# Patient Record
Sex: FEMALE | Race: BLACK OR AFRICAN AMERICAN | Employment: FULL TIME | URBAN - METROPOLITAN AREA
[De-identification: names, ages, dates, MRNs, and addresses within clinical notes are randomized per-mention and may not be internally consistent; named-entity substitution may affect disease eponyms.]

---

## 2020-11-22 ENCOUNTER — HOSPITAL ENCOUNTER (INPATIENT)
Age: 60
LOS: 6 days | Discharge: HOME | DRG: 177 | End: 2020-11-28
Attending: FAMILY MEDICINE | Admitting: FAMILY MEDICINE
Payer: COMMERCIAL

## 2020-11-22 ENCOUNTER — APPOINTMENT (OUTPATIENT)
Dept: GENERAL RADIOLOGY | Age: 60
DRG: 177 | End: 2020-11-22
Attending: STUDENT IN AN ORGANIZED HEALTH CARE EDUCATION/TRAINING PROGRAM
Payer: COMMERCIAL

## 2020-11-22 DIAGNOSIS — J12.82 PNEUMONIA DUE TO COVID-19 VIRUS: Primary | ICD-10-CM

## 2020-11-22 DIAGNOSIS — U07.1 PNEUMONIA DUE TO COVID-19 VIRUS: Primary | ICD-10-CM

## 2020-11-22 LAB
ALBUMIN SERPL-MCNC: 3 G/DL (ref 3.5–5)
ALBUMIN/GLOB SERPL: 0.6 {RATIO} (ref 1.1–2.2)
ALP SERPL-CCNC: 74 U/L (ref 45–117)
ALT SERPL-CCNC: 62 U/L (ref 12–78)
ANION GAP SERPL CALC-SCNC: 7 MMOL/L (ref 5–15)
ARTERIAL PATENCY WRIST A: ABNORMAL
AST SERPL W P-5'-P-CCNC: 29 U/L (ref 15–37)
BASE DEFICIT BLDA-SCNC: 2.2 MMOL/L (ref 0–2)
BASOPHILS # BLD: 0 K/UL (ref 0–0.1)
BASOPHILS NFR BLD: 0 % (ref 0–1)
BDY SITE: ABNORMAL
BILIRUB SERPL-MCNC: 0.9 MG/DL (ref 0.2–1)
BNP SERPL-MCNC: 15 PG/ML
BUN SERPL-MCNC: 11 MG/DL (ref 6–20)
BUN/CREAT SERPL: 15 (ref 12–20)
CA-I BLD-MCNC: 9.3 MG/DL (ref 8.5–10.1)
CHLORIDE SERPL-SCNC: 106 MMOL/L (ref 97–108)
CO2 SERPL-SCNC: 27 MMOL/L (ref 21–32)
CREAT SERPL-MCNC: 0.71 MG/DL (ref 0.55–1.02)
CRP SERPL-MCNC: 10.5 MG/DL (ref 0–0.6)
DIFFERENTIAL METHOD BLD: ABNORMAL
EOSINOPHIL # BLD: 0 K/UL (ref 0–0.4)
EOSINOPHIL NFR BLD: 0 % (ref 0–7)
ERYTHROCYTE [DISTWIDTH] IN BLOOD BY AUTOMATED COUNT: 14.5 % (ref 11.5–14.5)
FIO2 ON VENT: 21 %
GLOBULIN SER CALC-MCNC: 5.1 G/DL (ref 2–4)
GLUCOSE BLD STRIP.AUTO-MCNC: 158 MG/DL (ref 65–100)
GLUCOSE BLD STRIP.AUTO-MCNC: 159 MG/DL (ref 65–100)
GLUCOSE BLD STRIP.AUTO-MCNC: 94 MG/DL (ref 65–100)
GLUCOSE SERPL-MCNC: 157 MG/DL (ref 65–100)
HCO3 BLDA-SCNC: 22 MMOL/L (ref 22–26)
HCT VFR BLD AUTO: 39.8 % (ref 35–47)
HGB BLD-MCNC: 12.7 G/DL (ref 11.5–16)
IMM GRANULOCYTES # BLD AUTO: 0 K/UL (ref 0–0.04)
IMM GRANULOCYTES NFR BLD AUTO: 0 % (ref 0–0.5)
LACTATE SERPL-SCNC: 1.1 MMOL/L (ref 0.4–2)
LDH SERPL L TO P-CCNC: 402 U/L (ref 81–246)
LYMPHOCYTES # BLD: 0.7 K/UL (ref 0.8–3.5)
LYMPHOCYTES NFR BLD: 9 % (ref 12–49)
MCH RBC QN AUTO: 27.8 PG (ref 26–34)
MCHC RBC AUTO-ENTMCNC: 31.9 G/DL (ref 30–36.5)
MCV RBC AUTO: 87.1 FL (ref 80–99)
MONOCYTES # BLD: 0.7 K/UL (ref 0–1)
MONOCYTES NFR BLD: 8 % (ref 5–13)
NEUTS SEG # BLD: 6.4 K/UL (ref 1.8–8)
NEUTS SEG NFR BLD: 83 % (ref 32–75)
PCO2 BLDA: 35 MMHG (ref 35–45)
PERFORMED BY, TECHID: ABNORMAL
PERFORMED BY, TECHID: ABNORMAL
PERFORMED BY, TECHID: NORMAL
PH BLDA: 7.4 [PH] (ref 7.35–7.45)
PLATELET # BLD AUTO: 368 K/UL (ref 150–400)
PMV BLD AUTO: 9.9 FL (ref 8.9–12.9)
PO2 BLDA: 61 MMHG (ref 75–100)
POTASSIUM SERPL-SCNC: 3.2 MMOL/L (ref 3.5–5.1)
PROCALCITONIN SERPL-MCNC: <0.05 NG/ML
PROT SERPL-MCNC: 8.1 G/DL (ref 6.4–8.2)
RBC # BLD AUTO: 4.57 M/UL (ref 3.8–5.2)
SAO2 % BLD: 92 %
SAO2% DEVICE SAO2% SENSOR NAME: ABNORMAL
SODIUM SERPL-SCNC: 140 MMOL/L (ref 136–145)
TROPONIN I SERPL-MCNC: <0.05 NG/ML
WBC # BLD AUTO: 7.8 K/UL (ref 3.6–11)

## 2020-11-22 PROCEDURE — 83880 ASSAY OF NATRIURETIC PEPTIDE: CPT

## 2020-11-22 PROCEDURE — 83615 LACTATE (LD) (LDH) ENZYME: CPT

## 2020-11-22 PROCEDURE — 74011000250 HC RX REV CODE- 250: Performed by: INTERNAL MEDICINE

## 2020-11-22 PROCEDURE — XW033E5 INTRODUCTION OF REMDESIVIR ANTI-INFECTIVE INTO PERIPHERAL VEIN, PERCUTANEOUS APPROACH, NEW TECHNOLOGY GROUP 5: ICD-10-PCS | Performed by: INTERNAL MEDICINE

## 2020-11-22 PROCEDURE — 65270000029 HC RM PRIVATE

## 2020-11-22 PROCEDURE — 74011000258 HC RX REV CODE- 258: Performed by: FAMILY MEDICINE

## 2020-11-22 PROCEDURE — 85025 COMPLETE CBC W/AUTO DIFF WBC: CPT

## 2020-11-22 PROCEDURE — 93005 ELECTROCARDIOGRAM TRACING: CPT

## 2020-11-22 PROCEDURE — 82803 BLOOD GASES ANY COMBINATION: CPT

## 2020-11-22 PROCEDURE — 74011636637 HC RX REV CODE- 636/637: Performed by: FAMILY MEDICINE

## 2020-11-22 PROCEDURE — 71045 X-RAY EXAM CHEST 1 VIEW: CPT

## 2020-11-22 PROCEDURE — 96374 THER/PROPH/DIAG INJ IV PUSH: CPT

## 2020-11-22 PROCEDURE — 82962 GLUCOSE BLOOD TEST: CPT

## 2020-11-22 PROCEDURE — 74011250637 HC RX REV CODE- 250/637: Performed by: FAMILY MEDICINE

## 2020-11-22 PROCEDURE — 36415 COLL VENOUS BLD VENIPUNCTURE: CPT

## 2020-11-22 PROCEDURE — 74011000258 HC RX REV CODE- 258: Performed by: INTERNAL MEDICINE

## 2020-11-22 PROCEDURE — 80053 COMPREHEN METABOLIC PANEL: CPT

## 2020-11-22 PROCEDURE — 84484 ASSAY OF TROPONIN QUANT: CPT

## 2020-11-22 PROCEDURE — 74011250636 HC RX REV CODE- 250/636: Performed by: FAMILY MEDICINE

## 2020-11-22 PROCEDURE — 99221 1ST HOSP IP/OBS SF/LOW 40: CPT | Performed by: INTERNAL MEDICINE

## 2020-11-22 PROCEDURE — 99284 EMERGENCY DEPT VISIT MOD MDM: CPT

## 2020-11-22 PROCEDURE — 96375 TX/PRO/DX INJ NEW DRUG ADDON: CPT

## 2020-11-22 PROCEDURE — 74011000258 HC RX REV CODE- 258: Performed by: NURSE PRACTITIONER

## 2020-11-22 PROCEDURE — 74011250636 HC RX REV CODE- 250/636: Performed by: NURSE PRACTITIONER

## 2020-11-22 PROCEDURE — 83036 HEMOGLOBIN GLYCOSYLATED A1C: CPT

## 2020-11-22 PROCEDURE — 87040 BLOOD CULTURE FOR BACTERIA: CPT

## 2020-11-22 PROCEDURE — 84145 PROCALCITONIN (PCT): CPT

## 2020-11-22 PROCEDURE — 82728 ASSAY OF FERRITIN: CPT

## 2020-11-22 PROCEDURE — 83605 ASSAY OF LACTIC ACID: CPT

## 2020-11-22 PROCEDURE — 86140 C-REACTIVE PROTEIN: CPT

## 2020-11-22 RX ORDER — ENOXAPARIN SODIUM 100 MG/ML
40 INJECTION SUBCUTANEOUS DAILY
Status: DISCONTINUED | OUTPATIENT
Start: 2020-11-23 | End: 2020-11-23

## 2020-11-22 RX ORDER — SODIUM CHLORIDE 9 MG/ML
25 INJECTION, SOLUTION INTRAVENOUS CONTINUOUS
Status: DISCONTINUED | OUTPATIENT
Start: 2020-11-22 | End: 2020-11-28 | Stop reason: HOSPADM

## 2020-11-22 RX ORDER — MAGNESIUM SULFATE 100 %
4 CRYSTALS MISCELLANEOUS AS NEEDED
Status: DISCONTINUED | OUTPATIENT
Start: 2020-11-22 | End: 2020-11-28 | Stop reason: HOSPADM

## 2020-11-22 RX ORDER — POLYETHYLENE GLYCOL 3350 17 G/17G
17 POWDER, FOR SOLUTION ORAL DAILY PRN
Status: DISCONTINUED | OUTPATIENT
Start: 2020-11-22 | End: 2020-11-28 | Stop reason: HOSPADM

## 2020-11-22 RX ORDER — ONDANSETRON 2 MG/ML
4 INJECTION INTRAMUSCULAR; INTRAVENOUS
Status: DISCONTINUED | OUTPATIENT
Start: 2020-11-22 | End: 2020-11-28 | Stop reason: HOSPADM

## 2020-11-22 RX ORDER — AZITHROMYCIN 500 MG/1
500 TABLET, FILM COATED ORAL DAILY
Status: DISCONTINUED | OUTPATIENT
Start: 2020-11-23 | End: 2020-11-27

## 2020-11-22 RX ORDER — ACETAMINOPHEN 650 MG/1
650 SUPPOSITORY RECTAL
Status: DISCONTINUED | OUTPATIENT
Start: 2020-11-22 | End: 2020-11-24

## 2020-11-22 RX ORDER — DEXAMETHASONE SODIUM PHOSPHATE 4 MG/ML
4 INJECTION, SOLUTION INTRA-ARTICULAR; INTRALESIONAL; INTRAMUSCULAR; INTRAVENOUS; SOFT TISSUE EVERY 6 HOURS
Status: DISCONTINUED | OUTPATIENT
Start: 2020-11-22 | End: 2020-11-27

## 2020-11-22 RX ORDER — METHYLPREDNISOLONE 4 MG/1
TABLET ORAL
Qty: 1 DOSE PACK | Refills: 0 | Status: SHIPPED | OUTPATIENT
Start: 2020-11-22 | End: 2020-11-22 | Stop reason: CLARIF

## 2020-11-22 RX ORDER — POTASSIUM CHLORIDE 750 MG/1
40 TABLET, FILM COATED, EXTENDED RELEASE ORAL
Status: COMPLETED | OUTPATIENT
Start: 2020-11-22 | End: 2020-11-22

## 2020-11-22 RX ORDER — INSULIN LISPRO 100 [IU]/ML
INJECTION, SOLUTION INTRAVENOUS; SUBCUTANEOUS
Status: DISCONTINUED | OUTPATIENT
Start: 2020-11-22 | End: 2020-11-28 | Stop reason: HOSPADM

## 2020-11-22 RX ORDER — DEXTROSE 50 % IN WATER (D50W) INTRAVENOUS SYRINGE
25-50 AS NEEDED
Status: DISCONTINUED | OUTPATIENT
Start: 2020-11-22 | End: 2020-11-28 | Stop reason: HOSPADM

## 2020-11-22 RX ORDER — ACETAMINOPHEN 325 MG/1
650 TABLET ORAL
Status: DISCONTINUED | OUTPATIENT
Start: 2020-11-22 | End: 2020-11-24

## 2020-11-22 RX ORDER — ORPHENADRINE CITRATE 100 MG/1
100 TABLET, EXTENDED RELEASE ORAL 2 TIMES DAILY
Qty: 20 TAB | Refills: 0 | Status: SHIPPED | OUTPATIENT
Start: 2020-11-22 | End: 2020-11-22

## 2020-11-22 RX ORDER — ONDANSETRON 4 MG/1
4 TABLET, ORALLY DISINTEGRATING ORAL
Status: DISCONTINUED | OUTPATIENT
Start: 2020-11-22 | End: 2020-11-28 | Stop reason: HOSPADM

## 2020-11-22 RX ADMIN — INSULIN LISPRO 3 UNITS: 100 INJECTION, SOLUTION INTRAVENOUS; SUBCUTANEOUS at 17:42

## 2020-11-22 RX ADMIN — POTASSIUM CHLORIDE 40 MEQ: 750 TABLET, FILM COATED, EXTENDED RELEASE ORAL at 14:39

## 2020-11-22 RX ADMIN — DEXAMETHASONE SODIUM PHOSPHATE 4 MG: 4 INJECTION, SOLUTION INTRAMUSCULAR; INTRAVENOUS at 13:19

## 2020-11-22 RX ADMIN — METHYLPREDNISOLONE SODIUM SUCCINATE 125 MG: 125 INJECTION, POWDER, FOR SOLUTION INTRAMUSCULAR; INTRAVENOUS at 10:58

## 2020-11-22 RX ADMIN — DEXAMETHASONE SODIUM PHOSPHATE 4 MG: 4 INJECTION, SOLUTION INTRAMUSCULAR; INTRAVENOUS at 17:42

## 2020-11-22 RX ADMIN — PIPERACILLIN AND TAZOBACTAM 3.38 G: 3; .375 INJECTION, POWDER, LYOPHILIZED, FOR SOLUTION INTRAVENOUS at 10:58

## 2020-11-22 RX ADMIN — SODIUM CHLORIDE 1000 ML: 9 INJECTION, SOLUTION INTRAVENOUS at 09:48

## 2020-11-22 RX ADMIN — SODIUM CHLORIDE 200 MG: 9 INJECTION, SOLUTION INTRAVENOUS at 14:55

## 2020-11-22 RX ADMIN — PIPERACILLIN AND TAZOBACTAM 3.38 G: 3; .375 INJECTION, POWDER, LYOPHILIZED, FOR SOLUTION INTRAVENOUS at 20:49

## 2020-11-22 RX ADMIN — SODIUM CHLORIDE 75 ML/HR: 9 INJECTION, SOLUTION INTRAVENOUS at 14:39

## 2020-11-22 NOTE — CONSULTS
Consult  Pulmonary, Critical Care    Name: Rhina Mcbride MRN: [id number]   : 1960 Hospital: [address] [place] [address]   Date: 2020  Admission date: 2020 Hospital Day: 1       Subjective/Interval History:   Patient seen on medical floor. She is currently wearing oxygen. States she tested positive for COVID-19 a little under a week ago. Had been feeling fine but yesterday developed nonproductive cough and shortness of breath particularly with exertion. Felt slightly feverish but has not had any alteration in her sense of smell or taste no appetite suppression. Hospital Problems  Never Reviewed          Codes Class Noted POA    COVID-19 ICD-10-CM: U07.1  ICD-9-CM: 079.89  2020 Unknown              IMPRESSION:   1. COVID-19 infection  2. Probable COVID-19 pneumonitis  3. Acute hypoxic respiratory failure  4. Hypokalemia      RECOMMENDATIONS/PLAN:   1. Agree Decadron and remdesivir  2. We will check CT of the chest if severe groundglass changes will add Actemra  3. Follow potassium  4. We will decrease IV fluids to avoid worsening pulmonary congestion          [x] High complexity decision making was performed  [x] See my orders for details      Subjective/Initial History:     I was asked by Bryan Edgar MD to see Rhina Mcbride  a 61 y.o.    female in consultation for a chief complaint of COVID-19 infection with hypoxia and abnormal chest x-ray    Allergies   Allergen Reactions    Ampicillin Itching    Percocet [Oxycodone-Acetaminophen] Itching        MAR reviewed and pertinent medications noted or modified as needed     Current Facility-Administered Medications   Medication    0.9% sodium chloride infusion    acetaminophen (TYLENOL) tablet 650 mg    Or    acetaminophen (TYLENOL) suppository 650 mg    polyethylene glycol (MIRALAX) packet 17 g    ondansetron (ZOFRAN ODT) tablet 4 mg    Or    ondansetron (ZOFRAN) injection 4 mg    [START ON 2020] enoxaparin (LOVENOX) injection 40 mg    insulin lispro (HUMALOG) injection    glucose chewable tablet 16 g    dextrose (D50W) injection syrg 12.5-25 g    glucagon (GLUCAGEN) injection 1 mg    dexamethasone (DECADRON) 4 mg/mL injection 4 mg    piperacillin-tazobactam (ZOSYN) 3.375 g in 0.9% sodium chloride (MBP/ADV) 100 mL MBP    [START ON 11/23/2020] azithromycin (ZITHROMAX) tablet 500 mg    [START ON 11/23/2020] remdesivir 100 mg in 0.9% sodium chloride 250 mL IVPB    influenza vaccine 2020-21 (4 yrs+)(PF) (FLUCELVAX QUAD) injection 0.5 mL      Patient PCP: Danny Mistry PA-C  PMH:  has a past medical history of Diabetes ([address] GERD (gastroesophageal reflux disease), and Hypertension. PSH:   has a past surgical history that includes hx other surgical.   FHX: family history is not on file. SHX:  reports that she has never smoked. She has never used smokeless tobacco. She reports that she does not drink alcohol or use drugs. Systemic review:  General states her weight is stable has not had any change in her appetite.   May have had a little bit of fever at home  Eyes no double vision momentary blindness  ENT no facial pain over the sinuses or drainage  Musculoskeletal no swollen tender joints no myalgias  Endocrinologic no polyuria polydipsia  Neurologic no seizures or syncope  Gastrointestinal soft has a history of reflux states as long as she takes her medications she is asymptomatic no sour bitter taste at night no nausea vomiting as mentioned her sense of taste is normal  Genitourinary no pain or discomfort on urination no bleeding or drainage  Cardiovascular no ankle edema chest pain or diaphoresis  Respiratory new onset cough yesterday nonproductive no definite fever although she may have felt hot    Objective:     Vital Signs: Telemetry:    normal sinus rhythm Intake/Output:   Visit Vitals  /81 (BP 1 Location: Left arm, BP Patient Position: At rest)   Pulse (!) 107   Temp 98.4 °F (36.9 °C)   Resp 18   Ht 5' 6\" (1.676 m)   Wt 84.8 kg (186 lb 15.2 oz)   SpO2 91%   Breastfeeding No   BMI 30.17 kg/m²       Temp (24hrs), Av.1 °F (37.3 °C), Min:98.4 °F (36.9 °C), Max:99.7 °F (37.6 °C)        O2 Device: Nasal cannula O2 Flow Rate (L/min): 2 l/min       Wt Readings from Last 4 Encounters:   20 84.8 kg (186 lb 15.2 oz)        No intake or output data in the 24 hours ending 20 1721    Last shift:      No intake/output data recorded. Last 3 shifts: No intake/output data recorded. Physical Exam:   General:  female; in no apparent distress; on nasal oxygen 2 L  HEENT: NCAT, normal oral mucosa  Eyes: anicteric; conjunctiva clear extraocular movements intact  Neck: no nodes, no JVD no accessory muscle usage  Chest: no deformity,   Cardiac: Regular rate and rhythm no definite murmur no edema  Lungs: Clear anteriorly and laterally right posterior has rales extending FCI up left side has minimal rales in the left base  Abd: Soft nontender normal bowel sounds no organomegaly  Ext: no edema; no joint swelling; No clubbing  :clear urine  Neuro: Awake alert oriented speech is clear moves all 4 extremities normally  Psych- no agitation, oriented to person;  Skin: warm, dry, no cyanosis;   Pulses: Brachial radial femoral pulses intact  Capillary: Normal capillary refill    Labs:    Recent Labs     20  0845   WBC 7.8   HGB 12.7        Recent Labs     20  0845      K 3.2*      CO2 27   *   BUN 11   CREA 0.71   CA 9.3   LAC 1.1   ALB 3.0*   ALT 62     Recent Labs     20  1525   PH 7.402   PCO2 35   PO2 61*   HCO3 22   FIO2 21.0   Currently on 2 L nasal oxygen oxygen saturation 96%  Recent Labs     20  0845   TROIQ <0.05     CRP 10.5  Procalcitonin less than 0.05    BNP 15  Imaging:    CXR Results  (Last 48 hours)               20 0852  XR CHEST SNGL V Final result    Impression:  Impression:    1.  Mild pulmonary vascular congestion. 2. Mild bibasilar airspace disease, atelectasis versus developing pneumonia. Narrative:  AP portable chest, 0847 hours. Comparison: 11/12/2015. Findings: The cardiomediastinal silhouette is within normal limits. There is   mild pulmonary vascular congestion. Mild bibasilar airspace disease is noted. There is no pleural effusion or pneumothorax. There are senescent changes within   the spine. Results from [place] encounter on 11/22/20   XR CHEST SNGL V    Narrative AP portable chest, 0847 hours. Comparison: 11/12/2015. Findings: The cardiomediastinal silhouette is within normal limits. There is  mild pulmonary vascular congestion. Mild bibasilar airspace disease is noted. There is no pleural effusion or pneumothorax. There are senescent changes within  the spine. Impression Impression:   1. Mild pulmonary vascular congestion. 2. Mild bibasilar airspace disease, atelectasis versus developing pneumonia. · Discussion COVID-19 test +5 to 7 days ago with now with onset cough and shortness of breath. Chest x-ray has vague changes of bilateral patchy infiltrate. Will check CT scan to confirm groundglass changes. Will follow oxygen. Thank you for allowing me to see Ms. Jos Moncada MD

## 2020-11-22 NOTE — ROUTINE PROCESS
TRANSFER - OUT REPORT: 
 
Verbal report given to gregory(name) on Wilner Hammer  being transferred to [facility](unit) for routine progression of care Report consisted of patients Situation, Background, Assessment and  
Recommendations(SBAR). Information from the following report(s) SBAR, ED Summary, MAR, Recent Results and Med Rec Status was reviewed with the receiving nurse. Lines:  
Peripheral IV 11/22/20 Distal;Posterior;Right Forearm (Active) Site Assessment Clean, dry, & intact 11/22/20 0944 Dressing Status Clean, dry, & intact 11/22/20 0944 Dressing Type Transparent 11/22/20 8778 Opportunity for questions and clarification was provided. Patient transported with: 
 Registered Nurse

## 2020-11-22 NOTE — H&P
History and Physical    NAME: Beatrice Arnold   :  1960   MRN:  [id number]     Date/Time:  2020 12:26 PM    Patient PCP: Tim Quevedo PA-C  ______________________________________________________________________             Subjective:     CHIEF COMPLAINT:     Dizziness shortness of breath    HISTORY OF PRESENT ILLNESS:       Patient is a 61y.o. year old female past medical history of diabetes hypertension came to the ER complaining of dizziness and some shortness of breath patient was seen at [facility] for dizziness cough not feeling well screening for Covid came back positive her system get more worse still coughing shortness of breath on little exertion dizziness seen by the ER physician and recommended patient to be admitted for further evaluation and treatment    No past medical history on file. Medical history of diabetes and hypertension    No past surgical history on file. Social History     Tobacco Use    Smoking status: Not on file   Substance Use Topics    Alcohol use: Not on file   Patient denies of smoking cigarettes alcohol or drugs    No family history on file. No Known Allergies   No known drug allergies  Prior to Admission medications    Not on File         Current Facility-Administered Medications:     piperacillin-tazobactam (ZOSYN) 3.375 g in 0.9% sodium chloride (MBP/ADV) 100 mL MBP, 3.375 g, IntraVENous, NOW, Rhiannon Burks NP, Last Rate: 25 mL/hr at 20 1058, 3.375 g at 20 1058    dexamethasone (DECADRON) 4 mg/mL injection 4 mg, 4 mg, IntraVENous, Q6H, Orlin Kelly MD    piperacillin-tazobactam (ZOSYN) 3.375 g in 0.9% sodium chloride (MBP/ADV) 100 mL MBP, 3.375 g, IntraVENous, Q8H, Orlin Kelly MD    [START ON 2020] azithromycin (ZITHROMAX) tablet 500 mg, 500 mg, Oral, DAILY, Darren Kelly MD  No current outpatient medications on file.     LAB DATA REVIEWED:    Recent Results (from the past 24 hour(s))   CBC WITH AUTOMATED DIFF    Collection Time: 11/22/20  8:45 AM   Result Value Ref Range    WBC 7.8 3.6 - 11.0 K/uL    RBC 4.57 3.80 - 5.20 M/uL    HGB 12.7 11.5 - 16.0 g/dL    HCT 39.8 35.0 - 47.0 %    MCV 87.1 80.0 - 99.0 FL    MCH 27.8 26.0 - 34.0 PG    MCHC 31.9 30.0 - 36.5 g/dL    RDW 14.5 11.5 - 14.5 %    PLATELET 405 787 - 967 K/uL    MPV 9.9 8.9 - 12.9 FL    NEUTROPHILS 83 (H) 32 - 75 %    LYMPHOCYTES 9 (L) 12 - 49 %    MONOCYTES 8 5 - 13 %    EOSINOPHILS 0 0 - 7 %    BASOPHILS 0 0 - 1 %    IMMATURE GRANULOCYTES 0 0.0 - 0.5 %    ABS. NEUTROPHILS 6.4 1.8 - 8.0 K/UL    ABS. LYMPHOCYTES 0.7 (L) 0.8 - 3.5 K/UL    ABS. MONOCYTES 0.7 0.0 - 1.0 K/UL    ABS. EOSINOPHILS 0.0 0.0 - 0.4 K/UL    ABS. BASOPHILS 0.0 0.0 - 0.1 K/UL    ABS. IMM. GRANS. 0.0 0.00 - 0.04 K/UL    DF AUTOMATED     METABOLIC PANEL, COMPREHENSIVE    Collection Time: 11/22/20  8:45 AM   Result Value Ref Range    Sodium 140 136 - 145 mmol/L    Potassium 3.2 (L) 3.5 - 5.1 mmol/L    Chloride 106 97 - 108 mmol/L    CO2 27 21 - 32 mmol/L    Anion gap 7 5 - 15 mmol/L    Glucose 157 (H) 65 - 100 mg/dL    BUN 11 6 - 20 mg/dL    Creatinine 0.71 0.55 - 1.02 mg/dL    BUN/Creatinine ratio 15 12 - 20      GFR est AA >60 >60 ml/min/1.73m2    GFR est non-AA >60 >60 ml/min/1.73m2    Calcium 9.3 8.5 - 10.1 mg/dL    Bilirubin, total 0.9 0.2 - 1.0 mg/dL    AST (SGOT) 29 15 - 37 U/L    ALT (SGPT) 62 12 - 78 U/L    Alk.  phosphatase 74 45 - 117 U/L    Protein, total 8.1 6.4 - 8.2 g/dL    Albumin 3.0 (L) 3.5 - 5.0 g/dL    Globulin 5.1 (H) 2.0 - 4.0 g/dL    A-G Ratio 0.6 (L) 1.1 - 2.2     TROPONIN I    Collection Time: 11/22/20  8:45 AM   Result Value Ref Range    Troponin-I, Qt. <0.05 <0.05 ng/mL   BNP    Collection Time: 11/22/20  8:45 AM   Result Value Ref Range    NT pro-BNP 15 <125 pg/mL   LACTIC ACID    Collection Time: 11/22/20  8:45 AM   Result Value Ref Range    Lactic acid 1.1 0.4 - 2.0 mmol/L       XR Results (most recent):  Results from Hospital Encounter encounter on 11/22/20   XR CHEST SNGL V    Narrative AP portable chest, 0847 hours. Comparison: 11/12/2015. Findings: The cardiomediastinal silhouette is within normal limits. There is  mild pulmonary vascular congestion. Mild bibasilar airspace disease is noted. There is no pleural effusion or pneumothorax. There are senescent changes within  the spine. Impression Impression:   1. Mild pulmonary vascular congestion. 2. Mild bibasilar airspace disease, atelectasis versus developing pneumonia. XR CHEST SNGL V   Final Result   Impression:    1. Mild pulmonary vascular congestion. 2. Mild bibasilar airspace disease, atelectasis versus developing pneumonia. Review of Systems:  Constitutional: Negative for chills and fever. HENT: Negative. Eyes: Negative. Respiratory: Negative. Cardiovascular: Negative. Gastrointestinal: Negative for abdominal pain and nausea. Skin: Negative. Neurological: Negative. Objective:   VITALS:    Visit Vitals  /70 (BP 1 Location: Right arm, BP Patient Position: At rest)   Pulse (!) 107   Temp 99.7 °F (37.6 °C)   Resp 22   Ht 5' 6\" (1.676 m)   Wt 86.2 kg (190 lb)   SpO2 91%   BMI 30.67 kg/m²       Physical Exam:   Constitutional: pt is oriented to person, place, and time. HENT:   Head: Normocephalic and atraumatic. Eyes: Pupils are equal, round, and reactive to light. EOM are normal.   Cardiovascular: Normal rate, regular rhythm and normal heart sounds. Pulmonary/Chest: Breath sounds normal. No wheezes. No rales. Exhibits no tenderness. Abdominal: Soft. Bowel sounds are normal. There is no abdominal tenderness. There is no rebound and no guarding. Musculoskeletal: Normal range of motion. Neurological: pt is alert and oriented to person, place, and time. Alert. Normal strength. No cranial nerve deficit or sensory deficit. Displays a negative Romberg sign.         ASSESSMENT & PLAN:    COVID-19 positive  Acute hypoxemia  Type 2 diabetes  Hypertension  Hypokalemia    Put on Covid floor  On isolation  IV fluids  Start on Decadron 4 mg every 6 IV  IV Zosyn and Zithromax  ID consult and pulmonary consult      ________________________________________________________________________    Signed: Teri Logan MD

## 2020-11-22 NOTE — ED TRIAGE NOTES
[facility] 15 pt stated that she was feeling lightheaded, dizzy and SOB that started last Monday; denies CP c/o cough

## 2020-11-22 NOTE — CONSULTS
Consult Date: 11/22/2020    Consults: Covid-19    Subjective      This is a 61year old female with dizziness, SOB and cough. She apparently tested positive for Covid-19 at [facility] about 5 days PTA. She had low grade temperature and tachycardic. WBC normal with lymphopenia. CXR showed mild pulmonary vascular congestion and bibasilar airspace disease, atelectasis versus developing pneumonia (see image below, reviewed by me). Blood cultures sent and patient has been started on IV Azithromycin, Zosyn and Decadron. ID has been consulted for this reason. Patient seen in the ED. She states that she first sought medical attention for lightheadedness at work where she is an  at [employer]. She tested positive for Covid-19 but no treatment prescribed. She became concerned because of SOB. No change in cough. No fever or chills. No orthopnea or PND. No past medical history on file. No past surgical history on file. No family history on file. Social History     Tobacco Use    Smoking status: Not on file   Substance Use Topics    Alcohol use: Not on file       Current Facility-Administered Medications   Medication Dose Route Frequency Provider Last Rate Last Dose    piperacillin-tazobactam (ZOSYN) 3.375 g in 0.9% sodium chloride (MBP/ADV) 100 mL MBP  3.375 g IntraVENous NOW Dimas Santiago NP 25 mL/hr at 11/22/20 1058 3.375 g at 11/22/20 1058    dexamethasone (DECADRON) 4 mg/mL injection 4 mg  4 mg IntraVENous Q6H Jess Kelly MD        piperacillin-tazobactam (ZOSYN) 3.375 g in 0.9% sodium chloride (MBP/ADV) 100 mL MBP  3.375 g IntraVENous Q8H Orlin Kelly MD Annamaria Skylar [START ON 11/23/2020] azithromycin (ZITHROMAX) tablet 500 mg  500 mg Oral DAILY Orlin Kelly MD        potassium chloride SR (KLOR-CON 10) tablet 40 mEq  40 mEq Oral NOW Orlin Kelly MD            Review of Systems   Constitutional: Negative for activity change, appetite change, chills, fatigue and fever. HENT: Negative. Eyes: Negative. Respiratory: Positive for cough and shortness of breath. Negative for chest tightness and wheezing. Cardiovascular: Negative. Gastrointestinal: Negative. Endocrine: Negative. Genitourinary: Negative. Musculoskeletal: Negative. Skin: Negative. Allergic/Immunologic: Negative. Neurological: Positive for dizziness and light-headedness. Hematological: Negative. Psychiatric/Behavioral: Negative. Objective     Vital signs for last 24 hours:  Visit Vitals  /70 (BP 1 Location: Right arm, BP Patient Position: At rest)   Pulse (!) 107   Temp 99.7 °F (37.6 °C)   Resp 22   Ht 5' 6\" (1.676 m)   Wt 190 lb (86.2 kg)   SpO2 91%   BMI 30.67 kg/m²       Intake/Output this shift:  Current Shift: No intake/output data recorded. Last 3 Shifts: No intake/output data recorded. Data Review:   Recent Results (from the past 24 hour(s))   CBC WITH AUTOMATED DIFF    Collection Time: 11/22/20  8:45 AM   Result Value Ref Range    WBC 7.8 3.6 - 11.0 K/uL    RBC 4.57 3.80 - 5.20 M/uL    HGB 12.7 11.5 - 16.0 g/dL    HCT 39.8 35.0 - 47.0 %    MCV 87.1 80.0 - 99.0 FL    MCH 27.8 26.0 - 34.0 PG    MCHC 31.9 30.0 - 36.5 g/dL    RDW 14.5 11.5 - 14.5 %    PLATELET 156 249 - 530 K/uL    MPV 9.9 8.9 - 12.9 FL    NEUTROPHILS 83 (H) 32 - 75 %    LYMPHOCYTES 9 (L) 12 - 49 %    MONOCYTES 8 5 - 13 %    EOSINOPHILS 0 0 - 7 %    BASOPHILS 0 0 - 1 %    IMMATURE GRANULOCYTES 0 0.0 - 0.5 %    ABS. NEUTROPHILS 6.4 1.8 - 8.0 K/UL    ABS. LYMPHOCYTES 0.7 (L) 0.8 - 3.5 K/UL    ABS. MONOCYTES 0.7 0.0 - 1.0 K/UL    ABS. EOSINOPHILS 0.0 0.0 - 0.4 K/UL    ABS. BASOPHILS 0.0 0.0 - 0.1 K/UL    ABS. IMM.  GRANS. 0.0 0.00 - 0.04 K/UL    DF AUTOMATED     METABOLIC PANEL, COMPREHENSIVE    Collection Time: 11/22/20  8:45 AM   Result Value Ref Range    Sodium 140 136 - 145 mmol/L    Potassium 3.2 (L) 3.5 - 5.1 mmol/L    Chloride 106 97 - 108 mmol/L    CO2 27 21 - 32 mmol/L    Anion gap 7 5 - 15 mmol/L    Glucose 157 (H) 65 - 100 mg/dL    BUN 11 6 - 20 mg/dL    Creatinine 0.71 0.55 - 1.02 mg/dL    BUN/Creatinine ratio 15 12 - 20      GFR est AA >60 >60 ml/min/1.73m2    GFR est non-AA >60 >60 ml/min/1.73m2    Calcium 9.3 8.5 - 10.1 mg/dL    Bilirubin, total 0.9 0.2 - 1.0 mg/dL    AST (SGOT) 29 15 - 37 U/L    ALT (SGPT) 62 12 - 78 U/L    Alk. phosphatase 74 45 - 117 U/L    Protein, total 8.1 6.4 - 8.2 g/dL    Albumin 3.0 (L) 3.5 - 5.0 g/dL    Globulin 5.1 (H) 2.0 - 4.0 g/dL    A-G Ratio 0.6 (L) 1.1 - 2.2     TROPONIN I    Collection Time: 11/22/20  8:45 AM   Result Value Ref Range    Troponin-I, Qt. <0.05 <0.05 ng/mL   BNP    Collection Time: 11/22/20  8:45 AM   Result Value Ref Range    NT pro-BNP 15 <125 pg/mL   LACTIC ACID    Collection Time: 11/22/20  8:45 AM   Result Value Ref Range    Lactic acid 1.1 0.4 - 2.0 mmol/L     CXR (11/22)          Physical Exam  Vitals signs and nursing note reviewed. Constitutional:       General: She is not in acute distress. Appearance: Normal appearance. She is ill-appearing. She is not diaphoretic. HENT:      Head: Normocephalic and atraumatic. Nose: Nose normal.      Mouth/Throat:      Pharynx: Oropharynx is clear. Eyes:      Pupils: Pupils are equal, round, and reactive to light. Neck:      Musculoskeletal: Neck supple. Cardiovascular:      Rate and Rhythm: Normal rate and regular rhythm. Heart sounds: No murmur. Pulmonary:      Breath sounds: Rales present. No rhonchi. Abdominal:      Palpations: Abdomen is soft. Tenderness: There is no abdominal tenderness. There is no guarding. Genitourinary:     Comments: No [place]  Skin:     Findings: No rash. Neurological:      General: No focal deficit present. Mental Status: She is alert and oriented to person, place, and time. Psychiatric:         Mood and Affect: Mood normal.         Behavior: Behavior normal.         Thought Content:  Thought content normal.         Judgment: Judgment normal.       ASSESSMENT/PLAN    1. Covid-19 infection with possible pneumonitis. 2. Elevated CRP and LDH, possibly secondary to #1  3. Acute hypoxic respiratory failure, on nasal O2    Comment:  Concerned about progression of her Covid-19 infection supported by her SOB, hypoxia, abnormal CXR, elevated LDH and CRP. 1. Continue Azithromycin, Ceftriaxone, Decadron  2. Start Remdesivir IV  3. Hold off on Actemra for now  4. In am, repeat CRP, LDH  5. Follow-up blood cultures  6. Consider convalescent plasma  7. TTE to rule out cardiomyopathy    Cooper Roque MD

## 2020-11-22 NOTE — ED PROVIDER NOTES
EMERGENCY DEPARTMENT HISTORY AND PHYSICAL EXAM      Date: 11/22/2020  Patient Name: Thuy Nava      History of Presenting Illness     Chief Complaint   Patient presents with    Dizziness    Shortness of Breath       History Provided By: Patient    HPI: Thuy Nava, 61 y.o. female with a past medical history significant No significant past medical history and hypertension presents to the ED with cc of sob,dizziness worse. Pt seen at [facility] Monday for dizziness, cough, not feeling well. +covid there. Sx onset Sunday. Today continues with cough, sob, increasing dizziness. Malaise. There are no other complaints, changes, or physical findings at this time. PCP: Steve Torres PA-C    Current Facility-Administered Medications   Medication Dose Route Frequency Provider Last Rate Last Dose    piperacillin-tazobactam (ZOSYN) 3.375 g in 0.9% sodium chloride (MBP/ADV) 100 mL MBP  3.375 g IntraVENous NOW Luisa Paz, NP 25 mL/hr at 11/22/20 1058 3.375 g at 11/22/20 1058       Past History     Past Medical History:  No past medical history on file. Past Surgical History:  No past surgical history on file. Family History:  No family history on file. Social History:  Social History     Tobacco Use    Smoking status: Not on file   Substance Use Topics    Alcohol use: Not on file    Drug use: Not on file       Allergies:  No Known Allergies      Review of Systems     Review of Systems   Constitutional: Positive for activity change, chills, fatigue and fever. Negative for appetite change. HENT: Positive for congestion and rhinorrhea. Respiratory: Positive for cough and shortness of breath. Negative for wheezing. Cardiovascular: Negative. Negative for chest pain, palpitations and leg swelling. Gastrointestinal: Negative. Negative for abdominal pain, diarrhea, nausea and vomiting. Genitourinary: Negative. Musculoskeletal: Negative. Neurological: Negative. Psychiatric/Behavioral: Negative. Physical Exam     Physical Exam  Constitutional:       Appearance: Normal appearance. She is normal weight. HENT:      Head: Normocephalic and atraumatic. Eyes:      Extraocular Movements: Extraocular movements intact. Pupils: Pupils are equal, round, and reactive to light. Cardiovascular:      Rate and Rhythm: Normal rate and regular rhythm. Pulses: Normal pulses. Heart sounds: Normal heart sounds. Pulmonary:      Effort: Pulmonary effort is normal.      Breath sounds: Examination of the right-middle field reveals decreased breath sounds. Examination of the left-middle field reveals decreased breath sounds. Examination of the right-lower field reveals decreased breath sounds. Examination of the left-lower field reveals decreased breath sounds. Decreased breath sounds present. No wheezing, rhonchi or rales. Chest:      Chest wall: No tenderness or crepitus. Abdominal:      General: Abdomen is flat. Palpations: There is no mass. Tenderness: There is no abdominal tenderness. Musculoskeletal: Normal range of motion. Skin:     General: Skin is warm and dry. Findings: No rash. Neurological:      General: No focal deficit present. Mental Status: She is alert and oriented to person, place, and time.    Psychiatric:         Mood and Affect: Mood normal.         Behavior: Behavior normal.         Lab and Diagnostic Study Results     Labs -     Recent Results (from the past 12 hour(s))   CBC WITH AUTOMATED DIFF    Collection Time: 11/22/20  8:45 AM   Result Value Ref Range    WBC 7.8 3.6 - 11.0 K/uL    RBC 4.57 3.80 - 5.20 M/uL    HGB 12.7 11.5 - 16.0 g/dL    HCT 39.8 35.0 - 47.0 %    MCV 87.1 80.0 - 99.0 FL    MCH 27.8 26.0 - 34.0 PG    MCHC 31.9 30.0 - 36.5 g/dL    RDW 14.5 11.5 - 14.5 %    PLATELET 020 354 - 473 K/uL    MPV 9.9 8.9 - 12.9 FL    NEUTROPHILS 83 (H) 32 - 75 %    LYMPHOCYTES 9 (L) 12 - 49 %    MONOCYTES 8 5 - 13 % EOSINOPHILS 0 0 - 7 %    BASOPHILS 0 0 - 1 %    IMMATURE GRANULOCYTES 0 0.0 - 0.5 %    ABS. NEUTROPHILS 6.4 1.8 - 8.0 K/UL    ABS. LYMPHOCYTES 0.7 (L) 0.8 - 3.5 K/UL    ABS. MONOCYTES 0.7 0.0 - 1.0 K/UL    ABS. EOSINOPHILS 0.0 0.0 - 0.4 K/UL    ABS. BASOPHILS 0.0 0.0 - 0.1 K/UL    ABS. IMM. GRANS. 0.0 0.00 - 0.04 K/UL    DF AUTOMATED     METABOLIC PANEL, COMPREHENSIVE    Collection Time: 11/22/20  8:45 AM   Result Value Ref Range    Sodium 140 136 - 145 mmol/L    Potassium 3.2 (L) 3.5 - 5.1 mmol/L    Chloride 106 97 - 108 mmol/L    CO2 27 21 - 32 mmol/L    Anion gap 7 5 - 15 mmol/L    Glucose 157 (H) 65 - 100 mg/dL    BUN 11 6 - 20 mg/dL    Creatinine 0.71 0.55 - 1.02 mg/dL    BUN/Creatinine ratio 15 12 - 20      GFR est AA >60 >60 ml/min/1.73m2    GFR est non-AA >60 >60 ml/min/1.73m2    Calcium 9.3 8.5 - 10.1 mg/dL    Bilirubin, total 0.9 0.2 - 1.0 mg/dL    AST (SGOT) 29 15 - 37 U/L    ALT (SGPT) 62 12 - 78 U/L    Alk. phosphatase 74 45 - 117 U/L    Protein, total 8.1 6.4 - 8.2 g/dL    Albumin 3.0 (L) 3.5 - 5.0 g/dL    Globulin 5.1 (H) 2.0 - 4.0 g/dL    A-G Ratio 0.6 (L) 1.1 - 2.2     TROPONIN I    Collection Time: 11/22/20  8:45 AM   Result Value Ref Range    Troponin-I, Qt. <0.05 <0.05 ng/mL   BNP    Collection Time: 11/22/20  8:45 AM   Result Value Ref Range    NT pro-BNP 15 <125 pg/mL   LACTIC ACID    Collection Time: 11/22/20  8:45 AM   Result Value Ref Range    Lactic acid 1.1 0.4 - 2.0 mmol/L       Radiologic Studies -   [unfilled]  CT Results  (Last 48 hours)    None        CXR Results  (Last 48 hours)               11/22/20 0852  XR CHEST SNGL V Final result    Impression:  Impression:    1. Mild pulmonary vascular congestion. 2. Mild bibasilar airspace disease, atelectasis versus developing pneumonia. Narrative:  AP portable chest, 0847 hours. Comparison: 11/12/2015. Findings: The cardiomediastinal silhouette is within normal limits. There is   mild pulmonary vascular congestion. Mild bibasilar airspace disease is noted. There is no pleural effusion or pneumothorax. There are senescent changes within   the spine. Medical Decision Making and ED Course   - I am the first and primary provider for this patient. - I reviewed the vital signs, available nursing notes, past medical history, past surgical history, family history and social history. - Initial assessment performed. The patients presenting problems have been discussed, and the staff are in agreement with the care plan formulated and outlined with them. I have encouraged them to ask questions as they arise throughout their visit. Vital Signs-Reviewed the patient's vital signs. Patient Vitals for the past 12 hrs:   Temp Pulse Resp BP SpO2   11/22/20 0953  (!) 107 22 112/70 91 %   11/22/20 0827 99.7 °F (37.6 °C) (!) 118 16 112/68 91 %       EKG interpretation: (Preliminary): Performed at 0830, and read at 0832  Rhythm: sinus tachycardia; and regular .  Rate (approx.): 117; Axis: normal; CO interval: normal; QRS interval: normal ; ST/T wave: normal; Other findings: normal.      Records Reviewed: Nursing Notes    The patient presents with dizziness with a differential diagnosis of  dizziness/vertigo, generalized weakness, hypertension and pneumonia/hypoxia    ED Course:              Provider Notes (Medical Decision Making):   MDM  Number of Diagnoses or Management Options  Pneumonia due to COVID-19 virus: new, needed workup     Amount and/or Complexity of Data Reviewed  Clinical lab tests: ordered and reviewed  Tests in the radiology section of CPT®: ordered and reviewed  Discuss the patient with other providers: yes (Robin)    Risk of Complications, Morbidity, and/or Mortality  Presenting problems: moderate  Diagnostic procedures: moderate  Management options: moderate    Patient Progress  Patient progress: improved             Consultations:       Consultations: -  Hospitalist Consultant: Dr. Naveed Holt: We have asked for emergent assistance with regard to this patient. We have discussed the patients HPI, ROS, PE and results this far. They will come and evaluate the patient for admission. Procedures and Critical Care       Performed by: Steffi Rivera NP  PROCEDURES:none  Procedures         TOBACCO COUNSELING: Upon evaluation, pt expressed that they are a current tobacco user. For approximately 10 minutes, pt has been counseled on the dangers of smoking and was encouraged to quit as soon as possible in order to decrease further risks to their health. Pt has conveyed their understanding of the risks involved should they continue to use tobacco products. Steffi Rivera NP        Disposition     Disposition: Admitted to Floor Medical Floor the case was discussed with the admitting physician Robin    Admitted      Diagnosis     Clinical Impression:   1. Pneumonia due to COVID-19 virus        Attestations:    Steffi Rivera NP    Please note that this dictation was completed with [employer] the computer voice recognition software. Quite often unanticipated grammatical, syntax, homophones, and other interpretive errors are inadvertently transcribed by the computer software. Please disregard these errors. Please excuse any errors that have escaped final proofreading. Thank you.

## 2020-11-22 NOTE — PROGRESS NOTES
Problem: Falls - Risk of  Goal: *Absence of Falls  Description: Document Jaqueline Quijano Fall Risk and appropriate interventions in the flowsheet.   Outcome: Progressing Towards Goal  Note: Fall Risk Interventions:            Medication Interventions: Teach patient to arise slowly, Patient to call before getting OOB

## 2020-11-22 NOTE — ED PROVIDER NOTES
EMERGENCY DEPARTMENT HISTORY AND PHYSICAL EXAM 
 
 
Date: 11/22/2020 Patient Name: Abhishek Mg History of Presenting Illness Chief Complaint Patient presents with  Dizziness  Shortness of Breath History Provided By: Patient HPI: Abhishek Mg, 61 y.o. female with a past medical history significant hypertension, hyperlipidemia, obesity and osteoarthritis presents to the ED with cc of low back pain with radiation down both legs. Hx of same. Pt denies new injury There are no other complaints, changes, or physical findings at this time. PCP: Hendrick Riedel, PA-C No current facility-administered medications on file prior to encounter. No current outpatient medications on file prior to encounter. Past History Past Medical History: No past medical history on file. Past Surgical History: No past surgical history on file. Family History: No family history on file. Social History: 
Social History Tobacco Use  Smoking status: Not on file Substance Use Topics  Alcohol use: Not on file  Drug use: Not on file Allergies: 
No Known Allergies Review of Systems Review of Systems Constitutional: Negative. HENT: Negative. Respiratory: Negative. Cardiovascular: Negative. Gastrointestinal: Negative. Genitourinary: Negative. Musculoskeletal: Positive for arthralgias, back pain and myalgias. Neurological: Negative. Physical Exam  
 
Physical Exam 
Constitutional:   
   Appearance: Normal appearance. She is normal weight. HENT:  
   Head: Normocephalic and atraumatic. Eyes:  
   Extraocular Movements: Extraocular movements intact. Pupils: Pupils are equal, round, and reactive to light. Cardiovascular:  
   Rate and Rhythm: Normal rate and regular rhythm. Pulses: Normal pulses. Heart sounds: Normal heart sounds.   
Pulmonary:  
   Effort: Pulmonary effort is normal.  
 Breath sounds: Normal breath sounds. Abdominal:  
   General: Abdomen is flat. Musculoskeletal: Normal range of motion. Lumbar back: She exhibits tenderness, pain and spasm. She exhibits no bony tenderness, no swelling and no edema. Back: 
 
   Right lower leg: She exhibits no tenderness. No edema. Left lower leg: She exhibits no tenderness. No edema. Skin: 
   General: Skin is warm and dry. Neurological:  
   General: No focal deficit present. Mental Status: She is alert and oriented to person, place, and time. Psychiatric:     
   Mood and Affect: Mood normal.     
   Behavior: Behavior normal.  
 
 
 
Lab and Diagnostic Study Results Labs - No results found for this or any previous visit (from the past 12 hour(s)). Radiologic Studies -  
@lastxrresult@ CT Results  (Last 48 hours) None CXR Results  (Last 48 hours)  
          
 11/22/20 0852  XR CHEST SNGL V Final result Impression:  Impression: 1. Mild pulmonary vascular congestion. 2. Mild bibasilar airspace disease, atelectasis versus developing pneumonia. Narrative:  AP portable chest, 0847 hours. Comparison: 11/12/2015. Findings: The cardiomediastinal silhouette is within normal limits. There is  
mild pulmonary vascular congestion. Mild bibasilar airspace disease is noted. There is no pleural effusion or pneumothorax. There are senescent changes within  
the spine. Medical Decision Making - I am the first provider for this patient. - I reviewed the vital signs, available nursing notes, past medical history, past surgical history, family history and social history. - Initial assessment performed. The patients presenting problems have been discussed, and they are in agreement with the care plan formulated and outlined with them. I have encouraged them to ask questions as they arise throughout their visit. Vital Signs-Reviewed the patient's vital signs. Patient Vitals for the past 12 hrs: 
 Temp Pulse Resp BP SpO2  
11/22/20 0827 99.7 °F (37.6 °C) (!) 118 16 112/68 91 % Records Reviewed: Nursing Notes The patient presents with back pain with a differential diagnosis of  kidney stone, lumbar strain, pyelonephritis and traumatic injury ED Course:  
 
  
 
Provider Notes (Medical Decision Making): MDM Number of Diagnoses or Management Options Chronic bilateral low back pain with bilateral sciatica:  
Risk of Complications, Morbidity, and/or Mortality Presenting problems: low Diagnostic procedures: low Management options: low Patient Progress Patient progress: improved (Pain decreased after treatment. ) Procedures Disposition Disposition: DC- Adult Discharges: All of the diagnostic tests were reviewed and questions answered. Diagnosis, care plan and treatment options were discussed. The patient understands the instructions and will follow up as directed. The patients results have been reviewed with them. They have been counseled regarding their diagnosis. The patient verbally convey understanding and agreement of the signs, symptoms, diagnosis, treatment and prognosis and additionally agrees to follow up as recommended with their PCP in 24 - 48 hours. They also agree with the care-plan and convey that all of their questions have been answered. I have also put together some discharge instructions for them that include: 1) educational information regarding their diagnosis, 2) how to care for their diagnosis at home, as well a 3) list of reasons why they would want to return to the ED prior to their follow-up appointment, should their condition change. Discharged DISCHARGE PLAN: 
1. There are no discharge medications for this patient. 2.  
Follow-up Information Follow up With Specialties Details Why Contact Info Richard Reed PA-C Physician Assistant In 3 days  [phone] [id number] 
[phone] 3. Return to ED if worse 4. Current Discharge Medication List  
  
START taking these medications Details  
orphenadrine citrate (NORFLEX) 100 mg sr tablet Take 1 Tab by mouth two (2) times a day for 10 days. Qty: 20 Tab, Refills: 0  
  
methylPREDNISolone (Medrol, Agustín,) 4 mg tablet Take as directed Qty: 1 Dose Pack, Refills: 0 Diagnosis Clinical Impression: 1. Chronic bilateral low back pain with bilateral sciatica Attestations: 
 
Kurtis Gtz NP Please note that this dictation was completed with [employer], the computer voice recognition software. Quite often unanticipated grammatical, syntax, homophones, and other interpretive errors are inadvertently transcribed by the computer software. Please disregard these errors. Please excuse any errors that have escaped final proofreading. Thank you.

## 2020-11-23 ENCOUNTER — APPOINTMENT (OUTPATIENT)
Dept: CT IMAGING | Age: 60
DRG: 177 | End: 2020-11-23
Attending: INTERNAL MEDICINE
Payer: COMMERCIAL

## 2020-11-23 LAB
ALBUMIN SERPL-MCNC: 2.7 G/DL (ref 3.5–5)
ALBUMIN SERPL-MCNC: 2.8 G/DL (ref 3.5–5)
ALBUMIN/GLOB SERPL: 0.5 {RATIO} (ref 1.1–2.2)
ALP SERPL-CCNC: 76 U/L (ref 45–117)
ALT SERPL-CCNC: 61 U/L (ref 12–78)
ANION GAP SERPL CALC-SCNC: 11 MMOL/L (ref 5–15)
ANION GAP SERPL CALC-SCNC: 11 MMOL/L (ref 5–15)
AST SERPL W P-5'-P-CCNC: 31 U/L (ref 15–37)
ATRIAL RATE: 115 BPM
ATRIAL RATE: 117 BPM
BASOPHILS # BLD: 0 K/UL (ref 0–0.1)
BASOPHILS NFR BLD: 0 % (ref 0–1)
BILIRUB SERPL-MCNC: 0.5 MG/DL (ref 0.2–1)
BUN SERPL-MCNC: 17 MG/DL (ref 6–20)
BUN SERPL-MCNC: 18 MG/DL (ref 6–20)
BUN/CREAT SERPL: 24 (ref 12–20)
BUN/CREAT SERPL: 27 (ref 12–20)
CA-I BLD-MCNC: 9.2 MG/DL (ref 8.5–10.1)
CA-I BLD-MCNC: 9.2 MG/DL (ref 8.5–10.1)
CALCULATED P AXIS, ECG09: 51 DEGREES
CALCULATED P AXIS, ECG09: 54 DEGREES
CALCULATED R AXIS, ECG10: -21 DEGREES
CALCULATED R AXIS, ECG10: -23 DEGREES
CALCULATED T AXIS, ECG11: 36 DEGREES
CALCULATED T AXIS, ECG11: 38 DEGREES
CHLORIDE SERPL-SCNC: 109 MMOL/L (ref 97–108)
CHLORIDE SERPL-SCNC: 109 MMOL/L (ref 97–108)
CO2 SERPL-SCNC: 21 MMOL/L (ref 21–32)
CO2 SERPL-SCNC: 22 MMOL/L (ref 21–32)
CREAT SERPL-MCNC: 0.66 MG/DL (ref 0.55–1.02)
CREAT SERPL-MCNC: 0.71 MG/DL (ref 0.55–1.02)
CRP SERPL-MCNC: 8.69 MG/DL (ref 0–0.6)
DIAGNOSIS, 93000: NORMAL
DIAGNOSIS, 93000: NORMAL
DIFFERENTIAL METHOD BLD: ABNORMAL
EOSINOPHIL # BLD: 0 K/UL (ref 0–0.4)
EOSINOPHIL NFR BLD: 0 % (ref 0–7)
ERYTHROCYTE [DISTWIDTH] IN BLOOD BY AUTOMATED COUNT: 14.2 % (ref 11.5–14.5)
GLOBULIN SER CALC-MCNC: 5.2 G/DL (ref 2–4)
GLUCOSE BLD STRIP.AUTO-MCNC: 143 MG/DL (ref 65–100)
GLUCOSE BLD STRIP.AUTO-MCNC: 165 MG/DL (ref 65–100)
GLUCOSE BLD STRIP.AUTO-MCNC: 239 MG/DL (ref 65–100)
GLUCOSE BLD STRIP.AUTO-MCNC: 260 MG/DL (ref 65–100)
GLUCOSE SERPL-MCNC: 151 MG/DL (ref 65–100)
GLUCOSE SERPL-MCNC: 152 MG/DL (ref 65–100)
HCT VFR BLD AUTO: 40.4 % (ref 35–47)
HGB BLD-MCNC: 12.9 G/DL (ref 11.5–16)
IMM GRANULOCYTES # BLD AUTO: 0.1 K/UL (ref 0–0.04)
IMM GRANULOCYTES NFR BLD AUTO: 1 % (ref 0–0.5)
LDH SERPL L TO P-CCNC: 287 U/L (ref 81–246)
LYMPHOCYTES # BLD: 0.9 K/UL (ref 0.8–3.5)
LYMPHOCYTES NFR BLD: 11 % (ref 12–49)
MCH RBC QN AUTO: 27.5 PG (ref 26–34)
MCHC RBC AUTO-ENTMCNC: 31.9 G/DL (ref 30–36.5)
MCV RBC AUTO: 86.1 FL (ref 80–99)
MONOCYTES # BLD: 0.5 K/UL (ref 0–1)
MONOCYTES NFR BLD: 5 % (ref 5–13)
NEUTS SEG # BLD: 7.5 K/UL (ref 1.8–8)
NEUTS SEG NFR BLD: 83 % (ref 32–75)
P-R INTERVAL, ECG05: 142 MS
P-R INTERVAL, ECG05: 148 MS
PERFORMED BY, TECHID: ABNORMAL
PHOSPHATE SERPL-MCNC: 3.5 MG/DL (ref 2.6–4.7)
PLATELET # BLD AUTO: 394 K/UL (ref 150–400)
PMV BLD AUTO: 9.6 FL (ref 8.9–12.9)
POTASSIUM SERPL-SCNC: 3.7 MMOL/L (ref 3.5–5.1)
POTASSIUM SERPL-SCNC: 3.7 MMOL/L (ref 3.5–5.1)
PROCALCITONIN SERPL-MCNC: <0.05 NG/ML
PROT SERPL-MCNC: 7.9 G/DL (ref 6.4–8.2)
Q-T INTERVAL, ECG07: 322 MS
Q-T INTERVAL, ECG07: 326 MS
QRS DURATION, ECG06: 104 MS
QRS DURATION, ECG06: 106 MS
QTC CALCULATION (BEZET), ECG08: 445 MS
QTC CALCULATION (BEZET), ECG08: 454 MS
RBC # BLD AUTO: 4.69 M/UL (ref 3.8–5.2)
SODIUM SERPL-SCNC: 141 MMOL/L (ref 136–145)
SODIUM SERPL-SCNC: 142 MMOL/L (ref 136–145)
VENTRICULAR RATE, ECG03: 115 BPM
VENTRICULAR RATE, ECG03: 117 BPM
WBC # BLD AUTO: 8.9 K/UL (ref 3.6–11)

## 2020-11-23 PROCEDURE — 94762 N-INVAS EAR/PLS OXIMTRY CONT: CPT

## 2020-11-23 PROCEDURE — 74011000258 HC RX REV CODE- 258: Performed by: INTERNAL MEDICINE

## 2020-11-23 PROCEDURE — 86140 C-REACTIVE PROTEIN: CPT

## 2020-11-23 PROCEDURE — 36415 COLL VENOUS BLD VENIPUNCTURE: CPT

## 2020-11-23 PROCEDURE — 74011250636 HC RX REV CODE- 250/636: Performed by: INTERNAL MEDICINE

## 2020-11-23 PROCEDURE — 86900 BLOOD TYPING SEROLOGIC ABO: CPT

## 2020-11-23 PROCEDURE — 82962 GLUCOSE BLOOD TEST: CPT

## 2020-11-23 PROCEDURE — 85025 COMPLETE CBC W/AUTO DIFF WBC: CPT

## 2020-11-23 PROCEDURE — 65270000029 HC RM PRIVATE

## 2020-11-23 PROCEDURE — 74011636637 HC RX REV CODE- 636/637: Performed by: FAMILY MEDICINE

## 2020-11-23 PROCEDURE — 84145 PROCALCITONIN (PCT): CPT

## 2020-11-23 PROCEDURE — XW033H5 INTRODUCTION OF TOCILIZUMAB INTO PERIPHERAL VEIN, PERCUTANEOUS APPROACH, NEW TECHNOLOGY GROUP 5: ICD-10-PCS | Performed by: INTERNAL MEDICINE

## 2020-11-23 PROCEDURE — 80069 RENAL FUNCTION PANEL: CPT

## 2020-11-23 PROCEDURE — 74011000250 HC RX REV CODE- 250: Performed by: INTERNAL MEDICINE

## 2020-11-23 PROCEDURE — XW13325 TRANSFUSION OF CONVALESCENT PLASMA (NONAUTOLOGOUS) INTO PERIPHERAL VEIN, PERCUTANEOUS APPROACH, NEW TECHNOLOGY GROUP 5: ICD-10-PCS | Performed by: FAMILY MEDICINE

## 2020-11-23 PROCEDURE — 99232 SBSQ HOSP IP/OBS MODERATE 35: CPT | Performed by: INTERNAL MEDICINE

## 2020-11-23 PROCEDURE — 74011250637 HC RX REV CODE- 250/637: Performed by: FAMILY MEDICINE

## 2020-11-23 PROCEDURE — 71250 CT THORAX DX C-: CPT

## 2020-11-23 PROCEDURE — 74011250637 HC RX REV CODE- 250/637: Performed by: INTERNAL MEDICINE

## 2020-11-23 PROCEDURE — 80053 COMPREHEN METABOLIC PANEL: CPT

## 2020-11-23 PROCEDURE — 77010033678 HC OXYGEN DAILY

## 2020-11-23 PROCEDURE — 83615 LACTATE (LD) (LDH) ENZYME: CPT

## 2020-11-23 PROCEDURE — 74011000258 HC RX REV CODE- 258: Performed by: FAMILY MEDICINE

## 2020-11-23 PROCEDURE — 82728 ASSAY OF FERRITIN: CPT

## 2020-11-23 PROCEDURE — 74011250636 HC RX REV CODE- 250/636: Performed by: FAMILY MEDICINE

## 2020-11-23 RX ORDER — SODIUM CHLORIDE 9 MG/ML
250 INJECTION, SOLUTION INTRAVENOUS AS NEEDED
Status: DISCONTINUED | OUTPATIENT
Start: 2020-11-23 | End: 2020-11-28 | Stop reason: HOSPADM

## 2020-11-23 RX ADMIN — DEXAMETHASONE SODIUM PHOSPHATE 4 MG: 4 INJECTION, SOLUTION INTRAMUSCULAR; INTRAVENOUS at 18:10

## 2020-11-23 RX ADMIN — TOCILIZUMAB 400 MG: 20 INJECTION, SOLUTION, CONCENTRATE INTRAVENOUS at 15:34

## 2020-11-23 RX ADMIN — PIPERACILLIN AND TAZOBACTAM 3.38 G: 3; .375 INJECTION, POWDER, LYOPHILIZED, FOR SOLUTION INTRAVENOUS at 21:22

## 2020-11-23 RX ADMIN — APIXABAN 5 MG: 5 TABLET, FILM COATED ORAL at 21:22

## 2020-11-23 RX ADMIN — DEXAMETHASONE SODIUM PHOSPHATE 4 MG: 4 INJECTION, SOLUTION INTRAMUSCULAR; INTRAVENOUS at 12:06

## 2020-11-23 RX ADMIN — INSULIN LISPRO 3 UNITS: 100 INJECTION, SOLUTION INTRAVENOUS; SUBCUTANEOUS at 12:06

## 2020-11-23 RX ADMIN — INSULIN LISPRO 7 UNITS: 100 INJECTION, SOLUTION INTRAVENOUS; SUBCUTANEOUS at 16:18

## 2020-11-23 RX ADMIN — PIPERACILLIN AND TAZOBACTAM 3.38 G: 3; .375 INJECTION, POWDER, LYOPHILIZED, FOR SOLUTION INTRAVENOUS at 12:06

## 2020-11-23 RX ADMIN — DEXAMETHASONE SODIUM PHOSPHATE 4 MG: 4 INJECTION, SOLUTION INTRAMUSCULAR; INTRAVENOUS at 00:23

## 2020-11-23 RX ADMIN — INSULIN LISPRO 2 UNITS: 100 INJECTION, SOLUTION INTRAVENOUS; SUBCUTANEOUS at 21:22

## 2020-11-23 RX ADMIN — PIPERACILLIN AND TAZOBACTAM 3.38 G: 3; .375 INJECTION, POWDER, LYOPHILIZED, FOR SOLUTION INTRAVENOUS at 03:37

## 2020-11-23 RX ADMIN — ENOXAPARIN SODIUM 40 MG: 40 INJECTION SUBCUTANEOUS at 10:30

## 2020-11-23 RX ADMIN — DEXAMETHASONE SODIUM PHOSPHATE 4 MG: 4 INJECTION, SOLUTION INTRAMUSCULAR; INTRAVENOUS at 05:22

## 2020-11-23 RX ADMIN — SODIUM CHLORIDE 100 MG: 9 INJECTION, SOLUTION INTRAVENOUS at 13:58

## 2020-11-23 RX ADMIN — AZITHROMYCIN MONOHYDRATE 500 MG: 500 TABLET ORAL at 10:30

## 2020-11-23 NOTE — PROGRESS NOTES
General Daily Progress Note          Patient Name:   Manda Madrigal       YOB: 1960       Age:  61 y.o.       Admit Date: 11/22/2020      Subjective:       Patient still complaining of shortness of breath on 3 L oxygen by nasal cannula        Objective:     Visit Vitals  /80   Pulse 98   Temp 98.6 °F (37 °C)   Resp 18   Ht 5' 6\" (1.676 m)   Wt 84.8 kg (186 lb 15.2 oz)   SpO2 92%   Breastfeeding No   BMI 30.17 kg/m²        Recent Results (from the past 24 hour(s))   C REACTIVE PROTEIN, QT    Collection Time: 11/22/20  1:15 PM   Result Value Ref Range    C-Reactive protein 10.50 (H) 0.00 - 0.60 mg/dL   PROCALCITONIN    Collection Time: 11/22/20  1:15 PM   Result Value Ref Range    Procalcitonin <0.05 (H) 0 ng/mL   LD    Collection Time: 11/22/20  1:15 PM   Result Value Ref Range     (H) 81 - 246 U/L   BLOOD GAS, ARTERIAL    Collection Time: 11/22/20  3:25 PM   Result Value Ref Range    pH 7.402 7.35 - 7.45      PCO2 35 35 - 45 mmHg    PO2 61 (L) 75 - 100 mmHg    O2 SAT 92 (L) >95 %    BICARBONATE 22 22 - 26 mmol/L    BASE DEFICIT 2.2 (H) 0 - 2 mmol/L    O2 METHOD Room air      FIO2 21.0 %    SITE Right Radial      KADY'S TEST PASS     GLUCOSE, POC    Collection Time: 11/22/20  4:41 PM   Result Value Ref Range    Glucose (POC) 159 (H) 65 - 100 mg/dL    Performed by Nadeem Wills    CULTURE, BLOOD    Collection Time: 11/22/20  5:15 PM    Specimen: Blood   Result Value Ref Range    Special Requests: No Special Requests      Culture result: No growth after 3 hours     GLUCOSE, POC    Collection Time: 11/22/20  9:07 PM   Result Value Ref Range    Glucose (POC) 158 (H) 65 - 100 mg/dL    Performed by Shell Wright    GLUCOSE, POC    Collection Time: 11/22/20  9:25 PM   Result Value Ref Range    Glucose (POC) 94 65 - 100 mg/dL    Performed by Joseph Woodward COMPREHENSIVE    Collection Time: 11/23/20  7:35 AM   Result Value Ref Range    Sodium 141 136 - 145 mmol/L    Potassium 3.7 3.5 - 5.1 mmol/L    Chloride 109 (H) 97 - 108 mmol/L    CO2 21 21 - 32 mmol/L    Anion gap 11 5 - 15 mmol/L    Glucose 151 (H) 65 - 100 mg/dL    BUN 17 6 - 20 mg/dL    Creatinine 0.71 0.55 - 1.02 mg/dL    BUN/Creatinine ratio 24 (H) 12 - 20      GFR est AA >60 >60 ml/min/1.73m2    GFR est non-AA >60 >60 ml/min/1.73m2    Calcium 9.2 8.5 - 10.1 mg/dL    Bilirubin, total 0.5 0.2 - 1.0 mg/dL    AST (SGOT) 31 15 - 37 U/L    ALT (SGPT) 61 12 - 78 U/L    Alk. phosphatase 76 45 - 117 U/L    Protein, total 7.9 6.4 - 8.2 g/dL    Albumin 2.7 (L) 3.5 - 5.0 g/dL    Globulin 5.2 (H) 2.0 - 4.0 g/dL    A-G Ratio 0.5 (L) 1.1 - 2.2     CBC WITH AUTOMATED DIFF    Collection Time: 11/23/20  7:35 AM   Result Value Ref Range    WBC 8.9 3.6 - 11.0 K/uL    RBC 4.69 3.80 - 5.20 M/uL    HGB 12.9 11.5 - 16.0 g/dL    HCT 40.4 35.0 - 47.0 %    MCV 86.1 80.0 - 99.0 FL    MCH 27.5 26.0 - 34.0 PG    MCHC 31.9 30.0 - 36.5 g/dL    RDW 14.2 11.5 - 14.5 %    PLATELET 664 139 - 872 K/uL    MPV 9.6 8.9 - 12.9 FL    NEUTROPHILS 83 (H) 32 - 75 %    LYMPHOCYTES 11 (L) 12 - 49 %    MONOCYTES 5 5 - 13 %    EOSINOPHILS 0 0 - 7 %    BASOPHILS 0 0 - 1 %    IMMATURE GRANULOCYTES 1 (H) 0.0 - 0.5 %    ABS. NEUTROPHILS 7.5 1.8 - 8.0 K/UL    ABS. LYMPHOCYTES 0.9 0.8 - 3.5 K/UL    ABS. MONOCYTES 0.5 0.0 - 1.0 K/UL    ABS. EOSINOPHILS 0.0 0.0 - 0.4 K/UL    ABS. BASOPHILS 0.0 0.0 - 0.1 K/UL    ABS. IMM.  GRANS. 0.1 (H) 0.00 - 0.04 K/UL    DF AUTOMATED     C REACTIVE PROTEIN, QT    Collection Time: 11/23/20  7:35 AM   Result Value Ref Range    C-Reactive protein 8.69 (H) 0.00 - 0.60 mg/dL   PROCALCITONIN    Collection Time: 11/23/20  7:35 AM   Result Value Ref Range    Procalcitonin <0.05 (H) 0 ng/mL   LD    Collection Time: 11/23/20  7:35 AM   Result Value Ref Range     (H) 81 - 246 U/L   RENAL FUNCTION PANEL    Collection Time: 11/23/20  7:35 AM   Result Value Ref Range    Sodium 142 136 - 145 mmol/L    Potassium 3.7 3.5 - 5.1 mmol/L    Chloride 109 (H) 97 - 108 mmol/L    CO2 22 21 - 32 mmol/L    Anion gap 11 5 - 15 mmol/L    Glucose 152 (H) 65 - 100 mg/dL    BUN 18 6 - 20 mg/dL    Creatinine 0.66 0.55 - 1.02 mg/dL    BUN/Creatinine ratio 27 (H) 12 - 20      GFR est AA >60 >60 ml/min/1.73m2    GFR est non-AA >60 >60 ml/min/1.73m2    Calcium 9.2 8.5 - 10.1 mg/dL    Phosphorus 3.5 2.6 - 4.7 mg/dL    Albumin 2.8 (L) 3.5 - 5.0 g/dL   GLUCOSE, POC    Collection Time: 11/23/20  8:02 AM   Result Value Ref Range    Glucose (POC) 143 (H) 65 - 100 mg/dL    Performed by Ebenezer Garcia    GLUCOSE, POC    Collection Time: 11/23/20 11:15 AM   Result Value Ref Range    Glucose (POC) 165 (H) 65 - 100 mg/dL    Performed by Jacob Delarosa      [unfilled]      Review of Systems    Constitutional: Negative for chills and fever. HENT: Negative. Eyes: Negative. Respiratory: Negative. Cardiovascular: Negative. Gastrointestinal: Negative for abdominal pain and nausea. Skin: Negative. Neurological: Negative. Physical Exam:      Constitutional: pt is oriented to person, place, and time. HENT:   Head: Normocephalic and atraumatic. Eyes: Pupils are equal, round, and reactive to light. EOM are normal.   Cardiovascular: Normal rate, regular rhythm and normal heart sounds. Pulmonary/Chest: Breath sounds normal. No wheezes. No rales. Exhibits no tenderness. Abdominal: Soft. Bowel sounds are normal. There is no abdominal tenderness. There is no rebound and no guarding. Musculoskeletal: Normal range of motion. Neurological: pt is alert and oriented to person, place, and time. CT CHEST WO CONT   Final Result   Impression: Groundglass opacities in all lobes concerning for infection or   inflammatory process. Covid-19 pneumonia should be considered in the   appropriate clinical setting. Other findings as above. XR CHEST SNGL V   Final Result   Impression:    1. Mild pulmonary vascular congestion.    2. Mild bibasilar airspace disease, atelectasis versus developing pneumonia.            Recent Results (from the past 24 hour(s))   C REACTIVE PROTEIN, QT    Collection Time: 11/22/20  1:15 PM   Result Value Ref Range    C-Reactive protein 10.50 (H) 0.00 - 0.60 mg/dL   PROCALCITONIN    Collection Time: 11/22/20  1:15 PM   Result Value Ref Range    Procalcitonin <0.05 (H) 0 ng/mL   LD    Collection Time: 11/22/20  1:15 PM   Result Value Ref Range     (H) 81 - 246 U/L   BLOOD GAS, ARTERIAL    Collection Time: 11/22/20  3:25 PM   Result Value Ref Range    pH 7.402 7.35 - 7.45      PCO2 35 35 - 45 mmHg    PO2 61 (L) 75 - 100 mmHg    O2 SAT 92 (L) >95 %    BICARBONATE 22 22 - 26 mmol/L    BASE DEFICIT 2.2 (H) 0 - 2 mmol/L    O2 METHOD Room air      FIO2 21.0 %    SITE Right Radial      KADY'S TEST PASS     GLUCOSE, POC    Collection Time: 11/22/20  4:41 PM   Result Value Ref Range    Glucose (POC) 159 (H) 65 - 100 mg/dL    Performed by Travis Chen    CULTURE, BLOOD    Collection Time: 11/22/20  5:15 PM    Specimen: Blood   Result Value Ref Range    Special Requests: No Special Requests      Culture result: No growth after 3 hours     GLUCOSE, POC    Collection Time: 11/22/20  9:07 PM   Result Value Ref Range    Glucose (POC) 158 (H) 65 - 100 mg/dL    Performed by Beth Salcedo    GLUCOSE, POC    Collection Time: 11/22/20  9:25 PM   Result Value Ref Range    Glucose (POC) 94 65 - 100 mg/dL    Performed by SHAHRZAD CAVAZOS    METABOLIC PANEL, COMPREHENSIVE    Collection Time: 11/23/20  7:35 AM   Result Value Ref Range    Sodium 141 136 - 145 mmol/L    Potassium 3.7 3.5 - 5.1 mmol/L    Chloride 109 (H) 97 - 108 mmol/L    CO2 21 21 - 32 mmol/L    Anion gap 11 5 - 15 mmol/L    Glucose 151 (H) 65 - 100 mg/dL    BUN 17 6 - 20 mg/dL    Creatinine 0.71 0.55 - 1.02 mg/dL    BUN/Creatinine ratio 24 (H) 12 - 20      GFR est AA >60 >60 ml/min/1.73m2    GFR est non-AA >60 >60 ml/min/1.73m2    Calcium 9.2 8.5 - 10.1 mg/dL    Bilirubin, total 0.5 0.2 - 1.0 mg/dL    AST (SGOT) 31 15 - 37 U/L    ALT (SGPT) 61 12 - 78 U/L    Alk. phosphatase 76 45 - 117 U/L    Protein, total 7.9 6.4 - 8.2 g/dL    Albumin 2.7 (L) 3.5 - 5.0 g/dL    Globulin 5.2 (H) 2.0 - 4.0 g/dL    A-G Ratio 0.5 (L) 1.1 - 2.2     CBC WITH AUTOMATED DIFF    Collection Time: 11/23/20  7:35 AM   Result Value Ref Range    WBC 8.9 3.6 - 11.0 K/uL    RBC 4.69 3.80 - 5.20 M/uL    HGB 12.9 11.5 - 16.0 g/dL    HCT 40.4 35.0 - 47.0 %    MCV 86.1 80.0 - 99.0 FL    MCH 27.5 26.0 - 34.0 PG    MCHC 31.9 30.0 - 36.5 g/dL    RDW 14.2 11.5 - 14.5 %    PLATELET 094 024 - 941 K/uL    MPV 9.6 8.9 - 12.9 FL    NEUTROPHILS 83 (H) 32 - 75 %    LYMPHOCYTES 11 (L) 12 - 49 %    MONOCYTES 5 5 - 13 %    EOSINOPHILS 0 0 - 7 %    BASOPHILS 0 0 - 1 %    IMMATURE GRANULOCYTES 1 (H) 0.0 - 0.5 %    ABS. NEUTROPHILS 7.5 1.8 - 8.0 K/UL    ABS. LYMPHOCYTES 0.9 0.8 - 3.5 K/UL    ABS. MONOCYTES 0.5 0.0 - 1.0 K/UL    ABS. EOSINOPHILS 0.0 0.0 - 0.4 K/UL    ABS. BASOPHILS 0.0 0.0 - 0.1 K/UL    ABS. IMM.  GRANS. 0.1 (H) 0.00 - 0.04 K/UL    DF AUTOMATED     C REACTIVE PROTEIN, QT    Collection Time: 11/23/20  7:35 AM   Result Value Ref Range    C-Reactive protein 8.69 (H) 0.00 - 0.60 mg/dL   PROCALCITONIN    Collection Time: 11/23/20  7:35 AM   Result Value Ref Range    Procalcitonin <0.05 (H) 0 ng/mL   LD    Collection Time: 11/23/20  7:35 AM   Result Value Ref Range     (H) 81 - 246 U/L   RENAL FUNCTION PANEL    Collection Time: 11/23/20  7:35 AM   Result Value Ref Range    Sodium 142 136 - 145 mmol/L    Potassium 3.7 3.5 - 5.1 mmol/L    Chloride 109 (H) 97 - 108 mmol/L    CO2 22 21 - 32 mmol/L    Anion gap 11 5 - 15 mmol/L    Glucose 152 (H) 65 - 100 mg/dL    BUN 18 6 - 20 mg/dL    Creatinine 0.66 0.55 - 1.02 mg/dL    BUN/Creatinine ratio 27 (H) 12 - 20      GFR est AA >60 >60 ml/min/1.73m2    GFR est non-AA >60 >60 ml/min/1.73m2    Calcium 9.2 8.5 - 10.1 mg/dL    Phosphorus 3.5 2.6 - 4.7 mg/dL    Albumin 2.8 (L) 3.5 - 5.0 g/dL   GLUCOSE, POC    Collection Time: 11/23/20 8:02 AM   Result Value Ref Range    Glucose (POC) 143 (H) 65 - 100 mg/dL    Performed by Gaye Mascorro    GLUCOSE, POC    Collection Time: 11/23/20 11:15 AM   Result Value Ref Range    Glucose (POC) 165 (H) 65 - 100 mg/dL    Performed by Miguelangel Wisdom        Results     Procedure Component Value Units Date/Time    CULTURE, BLOOD #2 [[id number]] Collected:  11/22/20 1715    Order Status:  Completed Specimen:  Blood Updated:  11/23/20 1124     Special Requests: No Special Requests        Culture result: No growth after 3 hours       CULTURE, BLOOD #1 [[id number]] Collected:  11/22/20 1430    Order Status:  Completed Specimen:  Blood Updated:  11/23/20 0820    CULTURE, BLOOD, PAIRED [[id number]] Collected:  11/22/20 0935    Order Status:  Completed Specimen:  Blood Updated:  11/23/20 1124     Special Requests: No Special Requests        Culture result: No growth 1 day              Labs:     Recent Labs     11/23/20  0735 11/22/20  0845   WBC 8.9 7.8   HGB 12.9 12.7   HCT 40.4 39.8    368     Recent Labs     11/23/20  0735 11/22/20  0845     142 140   K 3.7  3.7 3.2*   *  109* 106   CO2 21  22 27   BUN 17  18 11   CREA 0.71  0.66 0.71   *  152* 157*   CA 9.2  9.2 9.3   PHOS 3.5  --      Recent Labs     11/23/20  0735 11/22/20  0845   ALT 61 62   AP 76 74   TBILI 0.5 0.9   TP 7.9 8.1   ALB 2.7*  2.8* 3.0*   GLOB 5.2* 5.1*     No results for input(s): INR, PTP, APTT, INREXT in the last 72 hours. No results for input(s): FE, TIBC, PSAT, FERR in the last 72 hours.    No results found for: FOL, RBCF   Recent Labs     11/22/20  1525   PH 7.402   PCO2 35   PO2 61*     Recent Labs     11/22/20  0845   TROIQ <0.05     No results found for: CHOL, CHOLX, CHLST, CHOLV, HDL, HDLP, LDL, LDLC, DLDLP, TGLX, TRIGL, TRIGP, CHHD, CHHDX  Lab Results   Component Value Date/Time    Glucose (POC) 165 (H) 11/23/2020 11:15 AM    Glucose (POC) 143 (H) 11/23/2020 08:02 AM    Glucose (POC) 94 11/22/2020 09:25 PM Glucose (POC) 158 (H) 11/22/2020 09:07 PM    Glucose (POC) 159 (H) 11/22/2020 04:41 PM     No results found for: COLOR, APPRN, SPGRU, REFSG, MITCH, PROTU, GLUCU, KETU, BILU, UROU, GARDEN[place], LEUKU, GLUKE, EPSU, BACTU, WBCU, RBCU, CASTS, UCRY      Assessment:     COVID-19 positive  Acute hypoxemia  Type 2 diabetes  Hypertension  Hypokalemia      Plan:     Continue Zosyn and Zithromax and and Decadron  Start IV remdesivir 100 mg daily    Monitor oxygen saturation  Monitor blood sugars        Current Facility-Administered Medications:     0.9% sodium chloride infusion, 25 mL/hr, IntraVENous, CONTINUOUS, Alayna Gerardo MD, Last Rate: 25 mL/hr at 11/22/20 1719, 25 mL/hr at 11/22/20 1719    acetaminophen (TYLENOL) tablet 650 mg, 650 mg, Oral, Q6H PRN **OR** acetaminophen (TYLENOL) suppository 650 mg, 650 mg, Rectal, Q6H PRN, Wendy Kelly MD    polyethylene glycol (MIRALAX) packet 17 g, 17 g, Oral, DAILY PRN, Wendy Kelly MD    ondansetron (ZOFRAN ODT) tablet 4 mg, 4 mg, Oral, Q8H PRN **OR** ondansetron (ZOFRAN) injection 4 mg, 4 mg, IntraVENous, Q6H PRN, Orlin Kelly MD    enoxaparin (LOVENOX) injection 40 mg, 40 mg, SubCUTAneous, DAILY, Orlin Kelly MD, 40 mg at 11/23/20 1030    insulin lispro (HUMALOG) injection, , SubCUTAneous, AC&HS, Orlin Kelly MD, 3 Units at 11/23/20 1206    glucose chewable tablet 16 g, 4 Tab, Oral, PRN, Wendy Kelly MD    dextrose (D50W) injection syrg 12.5-25 g, 25-50 mL, IntraVENous, PRN, Wendy Kelly MD    glucagon (GLUCAGEN) injection 1 mg, 1 mg, IntraMUSCular, PRN, Wendy Kelly MD    dexamethasone (DECADRON) 4 mg/mL injection 4 mg, 4 mg, IntraVENous, Q6H, Orlin Kelly MD, 4 mg at 11/23/20 1206    piperacillin-tazobactam (ZOSYN) 3.375 g in 0.9% sodium chloride (MBP/ADV) 100 mL MBP, 3.375 g, IntraVENous, Q8H, Orlin Kelly MD, Last Rate: 200 mL/hr at 11/23/20 1206, 3.375 g at 11/23/20 1206    azithromycin (ZITHROMAX) tablet 500 mg, 500 mg, Oral, DAILY, Orlin Kelly MD, 500 mg at 11/23/20 1030    remdesivir 100 mg in 0.9% sodium chloride 250 mL IVPB, 100 mg, IntraVENous, Q24H, Emily Faulkner MD    influenza vaccine 2020-21 (4 yrs+)(PF) (FLUCELVAX QUAD) injection 0.5 mL, 0.5 mL, IntraMUSCular, PRIOR TO DISCHARGE, Desmond Escobar MD

## 2020-11-23 NOTE — PROGRESS NOTES
Progress Note    Patient: Anika Burris MRN: [id number]  SSN: [id number]    YOB: 1960  Age: 61 y.o. Sex: female      Admit Date: 11/22/2020    LOS: 1 day     Subjective:   Patient followed for Covid-19 infection with pneumonia, supported by ground glass changes on CT Chest.  WBC normal and LDH/CRP decreasing. She is subjectively about the same. Complaining about cold ambient temperatures in her room. Objective:     Vitals:    11/23/20 0800 11/23/20 1029 11/23/20 1200 11/23/20 1226   BP:    124/80   Pulse: (!) 117  98 98   Resp:    18   Temp:    98.6 °F (37 °C)   SpO2:  92%     Weight:       Height:            Intake and Output:  Current Shift: No intake/output data recorded. Last three shifts: 11/21 1901 - 11/23 0700  In: 250 [I.V.:250]  Out: -     Physical Exam:    Vitals signs and nursing note reviewed. Constitutional:       General: She is not in acute distress. Appearance: Normal appearance. She is ill-appearing. She is not diaphoretic. HENT: unremarkable, nasal O2   Neck:      Musculoskeletal: Neck supple. Cardiovascular:      Rate and Rhythm: Normal rate and regular rhythm. Heart sounds: No murmur. Pulmonary:      Breath sounds: Rales present. No rhonchi. Abdominal:      Palpations: Abdomen is soft. Tenderness: There is no abdominal tenderness. There is no guarding. Genitourinary:     Comments: No [place]  Skin:     Findings: No rash. Neurological:      General: No focal deficit present. Mental Status: She is alert and oriented to person, place, and time. Psychiatric:         Mood and Affect: Mood normal.         Behavior: Behavior normal.         Thought Content:  Thought content normal.         Judgment: Judgment normal.     Lab/Data Review:     WBC 8,900   <402  CRP 8.69 <10.50    Blood cultures (11/22) Pending  Blood cultures (11/22) Pending  Blood cultures (11/22) Pending    CT Chest (11/23) Groundglass opacities in all lobes concerning for infection or  inflammatory process. Covid-19 pneumonia should be considered in the  appropriate clinical setting. Other findings as above. Assessment:     Active Problems:    COVID-19 (11/22/2020)    1. Covid-19 infection with possible pneumonitis. 2. Elevated CRP and LDH, possibly secondary to #1  3. Acute hypoxic respiratory failure, on nasal O2     Comment:  CT Chest supports clinical diagnosis of Covid pneumonia. LDH and CRP decreasing. Plan:   1. Continue Azithromycin, Ceftriaxone, Remdesivir, Decadron  2. Hold off on Actemra for now  3. In am, repeat CRP, LDH, ferritin, done  4. Follow-up blood cultures  5.  Order convalescent plasma       Signed By: Lupe Meneses MD     November 23, 2020

## 2020-11-23 NOTE — CONSULTS
Consult  Pulmonary, Critical Care    Name: Wilner Hammer MRN: [id number]   : 1960 Hospital: [facility]   Date: 2020  Admission date: 2020 Hospital Day: 2       Subjective/Interval History:   Patient seen on medical floor. She is currently wearing oxygen. States she tested positive for COVID-19 a little under a week ago. Had been feeling fine but yesterday developed nonproductive cough and shortness of breath particularly with exertion. Felt slightly feverish but has not had any alteration in her sense of smell or taste no appetite suppression.  wearing nasal oxygen states she feels fairly well minimal cough    Hospital Problems  Never Reviewed          Codes Class Noted POA    COVID-19 ICD-10-CM: U07.1  ICD-9-CM: 079.89  2020 Unknown              IMPRESSION:   1. COVID-19 infection  2. COVID-19 pneumonitis Marked groundglass infiltrates on CT chest  3. Acute hypoxic respiratory failure continue nasal oxygen  4. Hypokalemia repleted      RECOMMENDATIONS/PLAN:   1. Currently on Remdesivir and the drawn. Extensive infiltrate on CT of the chest will add Actemra  2. Due to hypercoagulable state with COVID-19 will fully anticoagulate with Eliquis  3. Follow potassium          [x] High complexity decision making was performed  [x] See my orders for details      Subjective/Initial History:     I was asked by Graham Issa MD to see Wilner Hammer  a 61 y.o.    female in consultation for a chief complaint of COVID-19 infection with hypoxia and abnormal chest x-ray    Allergies   Allergen Reactions    Ampicillin Itching    Percocet [Oxycodone-Acetaminophen] Itching        MAR reviewed and pertinent medications noted or modified as needed     Current Facility-Administered Medications   Medication    tocilizumab (ACTEMRA) 400 mg in 0.9% sodium chloride 100 mL infusion    [START ON 2020] tocilizumab (ACTEMRA) 400 mg in 0.9% sodium chloride 100 mL infusion    apixaban (ELIQUIS) tablet 10 mg    0.9% sodium chloride infusion    acetaminophen (TYLENOL) tablet 650 mg    Or    acetaminophen (TYLENOL) suppository 650 mg    polyethylene glycol (MIRALAX) packet 17 g    ondansetron (ZOFRAN ODT) tablet 4 mg    Or    ondansetron (ZOFRAN) injection 4 mg    insulin lispro (HUMALOG) injection    glucose chewable tablet 16 g    dextrose (D50W) injection syrg 12.5-25 g    glucagon (GLUCAGEN) injection 1 mg    dexamethasone (DECADRON) 4 mg/mL injection 4 mg    piperacillin-tazobactam (ZOSYN) 3.375 g in 0.9% sodium chloride (MBP/ADV) 100 mL MBP    azithromycin (ZITHROMAX) tablet 500 mg    remdesivir 100 mg in 0.9% sodium chloride 250 mL IVPB    influenza vaccine 2020-21 (4 yrs+)(PF) (FLUCELVAX QUAD) injection 0.5 mL      Patient PCP: Steve Torres PA-C  PMH:  has a past medical history of Diabetes ([address] GERD (gastroesophageal reflux disease), and Hypertension. PSH:   has a past surgical history that includes hx other surgical.   FHX: family history is not on file. SHX:  reports that she has never smoked. She has never used smokeless tobacco. She reports that she does not drink alcohol or use drugs. Systemic review:  General states her weight is stable has not had any change in her appetite.   May have had a little bit of fever at home  Eyes no double vision momentary blindness  ENT no facial pain over the sinuses or drainage  Musculoskeletal no swollen tender joints no myalgias  Endocrinologic no polyuria polydipsia  Neurologic no seizures or syncope  Gastrointestinal soft has a history of reflux states as long as she takes her medications she is asymptomatic no sour bitter taste at night no nausea vomiting as mentioned her sense of taste is normal  Genitourinary no pain or discomfort on urination no bleeding or drainage  Cardiovascular no ankle edema chest pain or diaphoresis  Respiratory new onset cough yesterday nonproductive no definite fever although she may have felt hot    Objective:     Vital Signs: Telemetry:    normal sinus rhythm Intake/Output:   Visit Vitals  /80   Pulse 98   Temp 98.6 °F (37 °C)   Resp 18   Ht 5' 6\" (1.676 m)   Wt 84.8 kg (186 lb 15.2 oz)   SpO2 92%   Breastfeeding No   BMI 30.17 kg/m²       Temp (24hrs), Av.5 °F (36.9 °C), Min:98.4 °F (36.9 °C), Max:98.6 °F (37 °C)        O2 Device: Nasal cannula O2 Flow Rate (L/min): 2 l/min       Wt Readings from Last 4 Encounters:   20 84.8 kg (186 lb 15.2 oz)          Intake/Output Summary (Last 24 hours) at 2020 1324  Last data filed at 2020 1719  Gross per 24 hour   Intake 250 ml   Output    Net 250 ml       Last shift:      No intake/output data recorded. Last 3 shifts:  1901 -  0700  In: 250 [I.V.:250]  Out: -        Physical Exam:   General:  female; in no apparent distress; on nasal oxygen 2 L  HEENT: NCAT, normal oral mucosa  Eyes: anicteric; conjunctiva clear extraocular movements intact  Neck: no nodes, no JVD no accessory muscle usage  Chest: no deformity,   Cardiac: Regular rate and rhythm no definite murmur no edema  Lungs: Clear anteriorly and laterally right posterior has rales extending CH[facility] up left side has minimal rales in the left base  Abd: Soft nontender normal bowel sounds no organomegaly  Ext: no edema; no joint swelling;  No clubbing  :clear urine  Neuro: Awake alert oriented speech is clear moves all 4 extremities normally  Psych- no agitation, oriented to person;  Skin: warm, dry, no cyanosis;   Pulses: Brachial radial femoral pulses intact  Capillary: Normal capillary refill    Labs:    Recent Labs     20  0735 20  0845   WBC 8.9 7.8   HGB 12.9 12.7    368     Recent Labs     20  0735 20  0845     142 140   K 3.7  3.7 3.2*   *  109* 106   CO2 21  22 27   *  152* 157*   BUN 17  18 11   CREA 0.71  0.66 0.71   CA 9.2  9.2 9.3   PHOS 3.5  -- LAC  --  1.1   ALB 2.7*  2.8* 3.0*   ALT 61 62     Recent Labs     11/22/20  1525   PH 7.402   PCO2 35   PO2 61*   HCO3 22   FIO2 21.0   Currently on 2 L nasal oxygen oxygen saturation 96%  Recent Labs     11/22/20  0845   TROIQ <0.05     CRP 10.5  Procalcitonin less than 0.05    BNP 15  Imaging:    CXR Results  (Last 48 hours)  11/23 CT chest shows extensive diffuse groundglass infiltrates               11/22/20 0852  XR CHEST SNGL V Final result    Impression:  Impression:    1. Mild pulmonary vascular congestion. 2. Mild bibasilar airspace disease, atelectasis versus developing pneumonia. Narrative:  AP portable chest, 0847 hours. Comparison: 11/12/2015. Findings: The cardiomediastinal silhouette is within normal limits. There is   mild pulmonary vascular congestion. Mild bibasilar airspace disease is noted. There is no pleural effusion or pneumothorax. There are senescent changes within   the spine. Results from [place] encounter on 11/22/20   XR CHEST SNGL V    Narrative AP portable chest, 0847 hours. Comparison: 11/12/2015. Findings: The cardiomediastinal silhouette is within normal limits. There is  mild pulmonary vascular congestion. Mild bibasilar airspace disease is noted. There is no pleural effusion or pneumothorax. There are senescent changes within  the spine. Impression Impression:   1. Mild pulmonary vascular congestion. 2. Mild bibasilar airspace disease, atelectasis versus developing pneumonia. · Discussion COVID-19 test + 5 to 7 days ago  now with  cough and shortness of breath. Chest x-ray has vague changes of bilateral patchy infiltrate. CT shows extensive groundglass infiltrates throughout the lungs.   Continue Remdesivir Decadron will add Actemra and fully anticoagulate with Eliquedgar Song MD

## 2020-11-24 LAB
ABO + RH BLD: NORMAL
ALBUMIN SERPL-MCNC: 3 G/DL (ref 3.5–5)
ALBUMIN/GLOB SERPL: 0.6 {RATIO} (ref 1.1–2.2)
ALP SERPL-CCNC: 87 U/L (ref 45–117)
ALT SERPL-CCNC: 51 U/L (ref 12–78)
ANION GAP SERPL CALC-SCNC: 5 MMOL/L (ref 5–15)
AST SERPL W P-5'-P-CCNC: 21 U/L (ref 15–37)
BASOPHILS # BLD: 0 K/UL (ref 0–0.1)
BASOPHILS NFR BLD: 0 % (ref 0–1)
BILIRUB SERPL-MCNC: 0.5 MG/DL (ref 0.2–1)
BLOOD GROUP ANTIBODIES SERPL: NEGATIVE
BUN SERPL-MCNC: 18 MG/DL (ref 6–20)
BUN/CREAT SERPL: 23 (ref 12–20)
CA-I BLD-MCNC: 9.6 MG/DL (ref 8.5–10.1)
CHLORIDE SERPL-SCNC: 107 MMOL/L (ref 97–108)
CO2 SERPL-SCNC: 29 MMOL/L (ref 21–32)
CREAT SERPL-MCNC: 0.8 MG/DL (ref 0.55–1.02)
CRP SERPL-MCNC: 4.1 MG/DL (ref 0–0.6)
DIFFERENTIAL METHOD BLD: ABNORMAL
EOSINOPHIL # BLD: 0 K/UL (ref 0–0.4)
EOSINOPHIL NFR BLD: 0 % (ref 0–7)
ERYTHROCYTE [DISTWIDTH] IN BLOOD BY AUTOMATED COUNT: 14 % (ref 11.5–14.5)
EST. AVERAGE GLUCOSE BLD GHB EST-MCNC: 148 MG/DL
FERRITIN SERPL-MCNC: 350 NG/ML (ref 8–252)
FERRITIN SERPL-MCNC: 355 NG/ML (ref 8–252)
FERRITIN SERPL-MCNC: 377 NG/ML (ref 8–252)
GLOBULIN SER CALC-MCNC: 5.2 G/DL (ref 2–4)
GLUCOSE BLD STRIP.AUTO-MCNC: 212 MG/DL (ref 65–100)
GLUCOSE BLD STRIP.AUTO-MCNC: 267 MG/DL (ref 65–100)
GLUCOSE BLD STRIP.AUTO-MCNC: 330 MG/DL (ref 65–100)
GLUCOSE BLD STRIP.AUTO-MCNC: 362 MG/DL (ref 65–100)
GLUCOSE SERPL-MCNC: 199 MG/DL (ref 65–100)
HBA1C MFR BLD: 6.8 % (ref 4–5.6)
HCT VFR BLD AUTO: 40.2 % (ref 35–47)
HGB BLD-MCNC: 13.2 G/DL (ref 11.5–16)
IMM GRANULOCYTES # BLD AUTO: 0.1 K/UL (ref 0–0.04)
IMM GRANULOCYTES NFR BLD AUTO: 1 % (ref 0–0.5)
LDH SERPL L TO P-CCNC: 302 U/L (ref 81–246)
LYMPHOCYTES # BLD: 0.8 K/UL (ref 0.8–3.5)
LYMPHOCYTES NFR BLD: 6 % (ref 12–49)
MCH RBC QN AUTO: 28.2 PG (ref 26–34)
MCHC RBC AUTO-ENTMCNC: 32.8 G/DL (ref 30–36.5)
MCV RBC AUTO: 85.9 FL (ref 80–99)
MONOCYTES # BLD: 0.6 K/UL (ref 0–1)
MONOCYTES NFR BLD: 5 % (ref 5–13)
NEUTS SEG # BLD: 11.5 K/UL (ref 1.8–8)
NEUTS SEG NFR BLD: 88 % (ref 32–75)
PERFORMED BY, TECHID: ABNORMAL
PLATELET # BLD AUTO: 443 K/UL (ref 150–400)
PMV BLD AUTO: 9.9 FL (ref 8.9–12.9)
POTASSIUM SERPL-SCNC: 4.7 MMOL/L (ref 3.5–5.1)
PROT SERPL-MCNC: 8.2 G/DL (ref 6.4–8.2)
RBC # BLD AUTO: 4.68 M/UL (ref 3.8–5.2)
SODIUM SERPL-SCNC: 141 MMOL/L (ref 136–145)
SPECIMEN EXP DATE BLD: NORMAL
WBC # BLD AUTO: 12.9 K/UL (ref 3.6–11)

## 2020-11-24 PROCEDURE — 36430 TRANSFUSION BLD/BLD COMPNT: CPT

## 2020-11-24 PROCEDURE — 86140 C-REACTIVE PROTEIN: CPT

## 2020-11-24 PROCEDURE — 74011000258 HC RX REV CODE- 258: Performed by: INTERNAL MEDICINE

## 2020-11-24 PROCEDURE — 74011636637 HC RX REV CODE- 636/637: Performed by: FAMILY MEDICINE

## 2020-11-24 PROCEDURE — 74011250636 HC RX REV CODE- 250/636: Performed by: INTERNAL MEDICINE

## 2020-11-24 PROCEDURE — 94762 N-INVAS EAR/PLS OXIMTRY CONT: CPT

## 2020-11-24 PROCEDURE — 77010033678 HC OXYGEN DAILY

## 2020-11-24 PROCEDURE — 82962 GLUCOSE BLOOD TEST: CPT

## 2020-11-24 PROCEDURE — 85025 COMPLETE CBC W/AUTO DIFF WBC: CPT

## 2020-11-24 PROCEDURE — 74011000250 HC RX REV CODE- 250: Performed by: INTERNAL MEDICINE

## 2020-11-24 PROCEDURE — 74011250637 HC RX REV CODE- 250/637: Performed by: FAMILY MEDICINE

## 2020-11-24 PROCEDURE — 65270000029 HC RM PRIVATE

## 2020-11-24 PROCEDURE — 82728 ASSAY OF FERRITIN: CPT

## 2020-11-24 PROCEDURE — 83615 LACTATE (LD) (LDH) ENZYME: CPT

## 2020-11-24 PROCEDURE — 80053 COMPREHEN METABOLIC PANEL: CPT

## 2020-11-24 PROCEDURE — 74011250636 HC RX REV CODE- 250/636: Performed by: FAMILY MEDICINE

## 2020-11-24 PROCEDURE — 36415 COLL VENOUS BLD VENIPUNCTURE: CPT

## 2020-11-24 PROCEDURE — 74011250637 HC RX REV CODE- 250/637: Performed by: INTERNAL MEDICINE

## 2020-11-24 PROCEDURE — 74011000258 HC RX REV CODE- 258: Performed by: FAMILY MEDICINE

## 2020-11-24 PROCEDURE — 99232 SBSQ HOSP IP/OBS MODERATE 35: CPT | Performed by: INTERNAL MEDICINE

## 2020-11-24 RX ORDER — IBUPROFEN 600 MG/1
600 TABLET ORAL
Status: DISCONTINUED | OUTPATIENT
Start: 2020-11-24 | End: 2020-11-28 | Stop reason: HOSPADM

## 2020-11-24 RX ADMIN — DEXAMETHASONE SODIUM PHOSPHATE 4 MG: 4 INJECTION, SOLUTION INTRAMUSCULAR; INTRAVENOUS at 00:06

## 2020-11-24 RX ADMIN — SODIUM CHLORIDE 25 ML/HR: 9 INJECTION, SOLUTION INTRAVENOUS at 19:00

## 2020-11-24 RX ADMIN — APIXABAN 10 MG: 5 TABLET, FILM COATED ORAL at 09:28

## 2020-11-24 RX ADMIN — SODIUM CHLORIDE 100 MG: 9 INJECTION, SOLUTION INTRAVENOUS at 14:28

## 2020-11-24 RX ADMIN — DEXAMETHASONE SODIUM PHOSPHATE 4 MG: 4 INJECTION, SOLUTION INTRAMUSCULAR; INTRAVENOUS at 23:41

## 2020-11-24 RX ADMIN — IBUPROFEN 600 MG: 600 TABLET ORAL at 14:28

## 2020-11-24 RX ADMIN — DEXAMETHASONE SODIUM PHOSPHATE 4 MG: 4 INJECTION, SOLUTION INTRAMUSCULAR; INTRAVENOUS at 13:21

## 2020-11-24 RX ADMIN — INSULIN LISPRO 4 UNITS: 100 INJECTION, SOLUTION INTRAVENOUS; SUBCUTANEOUS at 21:16

## 2020-11-24 RX ADMIN — INSULIN LISPRO 10 UNITS: 100 INJECTION, SOLUTION INTRAVENOUS; SUBCUTANEOUS at 17:41

## 2020-11-24 RX ADMIN — DEXAMETHASONE SODIUM PHOSPHATE 4 MG: 4 INJECTION, SOLUTION INTRAMUSCULAR; INTRAVENOUS at 21:17

## 2020-11-24 RX ADMIN — PIPERACILLIN AND TAZOBACTAM 3.38 G: 3; .375 INJECTION, POWDER, LYOPHILIZED, FOR SOLUTION INTRAVENOUS at 21:17

## 2020-11-24 RX ADMIN — INSULIN LISPRO 4 UNITS: 100 INJECTION, SOLUTION INTRAVENOUS; SUBCUTANEOUS at 09:28

## 2020-11-24 RX ADMIN — PIPERACILLIN AND TAZOBACTAM 3.38 G: 3; .375 INJECTION, POWDER, LYOPHILIZED, FOR SOLUTION INTRAVENOUS at 05:03

## 2020-11-24 RX ADMIN — APIXABAN 10 MG: 5 TABLET, FILM COATED ORAL at 21:17

## 2020-11-24 RX ADMIN — INSULIN LISPRO 15 UNITS: 100 INJECTION, SOLUTION INTRAVENOUS; SUBCUTANEOUS at 13:21

## 2020-11-24 RX ADMIN — DEXAMETHASONE SODIUM PHOSPHATE 4 MG: 4 INJECTION, SOLUTION INTRAMUSCULAR; INTRAVENOUS at 05:47

## 2020-11-24 RX ADMIN — AZITHROMYCIN MONOHYDRATE 500 MG: 500 TABLET ORAL at 09:28

## 2020-11-24 RX ADMIN — PIPERACILLIN AND TAZOBACTAM 3.38 G: 3; .375 INJECTION, POWDER, LYOPHILIZED, FOR SOLUTION INTRAVENOUS at 13:30

## 2020-11-24 RX ADMIN — TOCILIZUMAB 400 MG: 20 INJECTION, SOLUTION, CONCENTRATE INTRAVENOUS at 13:25

## 2020-11-24 NOTE — PROGRESS NOTES
General Daily Progress Note          Patient Name:   Anika Burris       YOB: 1960       Age:  61 y.o. Admit Date: 11/22/2020      Subjective:       Patient still complaining of shortness of breath on 3 L oxygen by nasal cannula  Saturation 92%        Objective:     Visit Vitals  BP (!) 146/90 (BP 1 Location: Left arm)   Pulse 93   Temp 97.8 °F (36.6 °C)   Resp 20   Ht 5' 6\" (1.676 m)   Wt 84.8 kg (186 lb 15.2 oz)   SpO2 93%   Breastfeeding No   BMI 30.17 kg/m²        Recent Results (from the past 24 hour(s))   [facility]AS[facility], [facility]OCATE    Collection Time: 11/23/20  3:45 PM   Result Value Ref Range    Unit number [id number]     Blood component type ConvPls,     Unit division 00     Status of unit [address] to transfuse    GLUCOSE, POC    Collection Time: 11/23/20  3:51 PM   Result Value Ref Range    Glucose (POC) 260 (H) 65 - 100 mg/dL    Performed by [address] [place] [id number], POC    Collection Time: 11/23/20  7:43 PM   Result Value Ref Range    Glucose (POC) 239 (H) 65 - 100 mg/dL    Performed by [address]    Collection Time: 11/23/20 10:00 PM   Result Value Ref Range    Crossmatch Expiration 11/26/2020,2359     ABO/Rh(D) Ida Stable Positive     Antibody screen Negative    GLUCOSE, POC    Collection Time: 11/24/20  8:27 AM   Result Value Ref Range    Glucose (POC) 212 (H) 65 - 100 mg/dL    Performed by Zee Holt      [unfilled]      Review of Systems    Constitutional: Negative for chills and fever. HENT: Negative. Eyes: Negative. Respiratory: Negative. Cardiovascular: Negative. Gastrointestinal: Negative for abdominal pain and nausea. Skin: Negative. Neurological: Negative. Physical Exam:      Constitutional: pt is oriented to person, place, and time. HENT:   Head: Normocephalic and atraumatic. Eyes: Pupils are equal, round, and reactive to light.  EOM are normal.   Cardiovascular: Normal rate, regular rhythm and normal heart sounds. Pulmonary/Chest: Breath sounds normal. No wheezes. No rales. Exhibits no tenderness. Abdominal: Soft. Bowel sounds are normal. There is no abdominal tenderness. There is no rebound and no guarding. Musculoskeletal: Normal range of motion. Neurological: pt is alert and oriented to person, place, and time. CT CHEST WO CONT   Final Result   Impression: Groundglass opacities in all lobes concerning for infection or   inflammatory process. Covid-19 pneumonia should be considered in the   appropriate clinical setting. Other findings as above. XR CHEST SNGL V   Final Result   Impression:    1. Mild pulmonary vascular congestion. 2. Mild bibasilar airspace disease, atelectasis versus developing pneumonia.            Recent Results (from the past 24 hour(s))   [facility]ASMA, [facility]OC[facility]    Collection Time: 11/23/20  3:45 PM   Result Value Ref Range    Unit number [id number]     Blood component type ConvPls,     Unit division 00     Status of unit [address] to transfuse    GLUCOSE, POC    Collection Time: 11/23/20  3:51 PM   Result Value Ref Range    Glucose (POC) 260 (H) 65 - 100 mg/dL    Performed by Libby Carrillo    GLUCOSE, POC    Collection Time: 11/23/20  7:43 PM   Result Value Ref Range    Glucose (POC) 239 (H) 65 - 100 mg/dL    Performed by [address]    Collection Time: 11/23/20 10:00 PM   Result Value Ref Range    Crossmatch Expiration 11/26/2020,2359     ABO/Rh(D) O Positive     Antibody screen Negative    GLUCOSE, POC    Collection Time: 11/24/20  8:27 AM   Result Value Ref Range    Glucose (POC) 212 (H) 65 - 100 mg/dL    Performed by Libby Carrillo        Results     Procedure Component Value Units Date/Time    CULTURE, BLOOD #2 [[id number]] Collected:  11/22/20 1715    Order Status:  Completed Specimen:  Blood Updated:  11/24/20 0812     Special Requests: No Special Requests        Culture result: No growth after 23 hours       CULTURE, BLOOD #1 [[id number]] Collected:  11/22/20 1430    Order Status:  Completed Specimen:  Blood Updated:  11/23/20 0820    CULTURE, BLOOD, PAIRED [[id number]] Collected:  11/22/20 0935    Order Status:  Completed Specimen:  Blood Updated:  11/24/20 0812     Special Requests: No Special Requests        Culture result: No growth 2 days              Labs:     Recent Labs     11/23/20  0735 11/22/20  0845   WBC 8.9 7.8   HGB 12.9 12.7   HCT 40.4 39.8    368     Recent Labs     11/23/20  0735 11/22/20  0845     142 140   K 3.7  3.7 3.2*   *  109* 106   CO2 21  22 27   BUN 17  18 11   CREA 0.71  0.66 0.71   *  152* 157*   CA 9.2  9.2 9.3   PHOS 3.5  --      Recent Labs     11/23/20  0735 11/22/20  0845   ALT 61 62   AP 76 74   TBILI 0.5 0.9   TP 7.9 8.1   ALB 2.7*  2.8* 3.0*   GLOB 5.2* 5.1*     No results for input(s): INR, PTP, APTT, INREXT, INREXT in the last 72 hours.    Recent Labs     11/23/20  0735 11/22/20  1315   FERR 350* 377*      No results found for: FOL, RBCF   Recent Labs     11/22/20  1525   PH 7.402   PCO2 35   PO2 61*     Recent Labs     11/22/20  0845   TROIQ <0.05     No results found for: CHOL, CHOLX, CHLST, CHOLV, HDL, HDLP, LDL, LDLC, DLDLP, TGLX, TRIGL, TRIGP, CHHD, CHHDX  Lab Results   Component Value Date/Time    Glucose (POC) 212 (H) 11/24/2020 08:27 AM    Glucose (POC) 239 (H) 11/23/2020 07:43 PM    Glucose (POC) 260 (H) 11/23/2020 03:51 PM    Glucose (POC) 165 (H) 11/23/2020 11:15 AM    Glucose (POC) 143 (H) 11/23/2020 08:02 AM     No results found for: COLOR, APPRN, SPGRU, REFSG, MITCH, PROTU, GLUCU, KETU, BILU, UROU, GARDENIA, LEUKU, GLUKE, EPSU, BACTU, WBCU, RBCU, CASTS, UCRY      Assessment:     COVID-19 positive  Acute hypoxemia  Type 2 diabetes  Hypertension  Hypokalemia      Plan:     Continue Zosyn and Zithromax and and Decadron  Start IV remdesivir 100 mg daily   order convalescent plasma      Monitor oxygen saturation  Monitor blood sugars        Current Facility-Administered Medications:     tocilizumab 400 mg in 0.9% sodium chloride 100 mL infusion, 400 mg, IntraVENous, ONCE, Candelario Trivedi MD    apixaban [place] ) tablet 10 mg, 10 mg, Oral, BID, Candelario Trivedi MD, 10 mg at 11/24/20 5411    0.9% sodium chloride infusion 250 mL, 250 mL, IntraVENous, PRN, Pramod Camp MD    0.9% sodium chloride infusion, 25 mL/hr, IntraVENous, CONTINUOUS, Candelario Trivedi MD, Last Rate: 25 mL/hr at 11/22/20 1719, 25 mL/hr at 11/22/20 1719    acetaminophen (TYLENOL) tablet 650 mg, 650 mg, Oral, Q6H PRN **OR** acetaminophen (TYLENOL) suppository 650 mg, 650 mg, Rectal, Q6H PRN, Albert Kelly MD    polyethylene glycol (MIRALAX) packet 17 g, 17 g, Oral, DAILY PRN, Albert Kelly MD    ondansetron (ZOFRAN ODT) tablet 4 mg, 4 mg, Oral, Q8H PRN **OR** ondansetron (ZOFRAN) injection 4 mg, 4 mg, IntraVENous, Q6H PRN, Albert Kelly MD    insulin lispro (HUMALOG) injection, , SubCUTAneous, AC&HS, Orlin Kelly MD, 4 Units at 11/24/20 5536    glucose chewable tablet 16 g, 4 Tab, Oral, PRN, Albert Kelly MD    dextrose (D50W) injection syrg 12.5-25 g, 25-50 mL, IntraVENous, PRN, Albert Kelly MD    glucagon (GLUCAGEN) injection 1 mg, 1 mg, IntraMUSCular, PRN, Albert Kelly MD    dexamethasone (DECADRON) 4 mg/mL injection 4 mg, 4 mg, IntraVENous, Q6H, Orlin Kelly MD, 4 mg at 11/24/20 0547    piperacillin-tazobactam (ZOSYN) 3.375 g in 0.9% sodium chloride (MBP/ADV) 100 mL MBP, 3.375 g, IntraVENous, Q8H, Orlin Kelly MD, Last Rate: 200 mL/hr at 11/24/20 0503, 3.375 g at 11/24/20 0503    azithromycin (ZITHROMAX) tablet 500 mg, 500 mg, Oral, DAILY, Orlin Kelly MD, 500 mg at 11/24/20 9434    remdesivir 100 mg in 0.9% sodium chloride 250 mL IVPB, 100 mg, IntraVENous, Q24H, Pramod Camp MD, 100 mg at 11/23/20 1358    influenza vaccine 2020-21 (4 yrs+)(PF) (FLUCELVAX QUAD) injection 0.5 mL, 0.5 mL, IntraMUSCular, PRIOR TO DISCHARGE, Danuta Avina MD

## 2020-11-24 NOTE — PROGRESS NOTES
Progress Note    Patient: Gagan Arthur MRN: [id number]  SSN: [id number]    YOB: 1960  Age: 61 y.o. Sex: female      Admit Date: 11/22/2020    LOS: 2 days     Subjective:   Patient followed for Covid-19 infection with pneumonia, supported by ground glass changes on CT Chest.  WBC normal and LDH/CRP decreasing. She is subjectively improved. Objective:     Vitals:    11/24/20 0808 11/24/20 0930 11/24/20 0953 11/24/20 1123   BP: 139/83 (!) 143/90 (!) 146/90    Pulse: 86 86 93    Resp: 20 20 20    Temp: 98.3 °F (36.8 °C) 98.2 °F (36.8 °C) 97.8 °F (36.6 °C)    SpO2: 94% 93% 93% 93%   Weight:       Height:            Intake and Output:  Current Shift: 11/24 0701 - 11/24 1900  In: 300   Out: -   Last three shifts: No intake/output data recorded. Physical Exam:    Vitals signs and nursing note reviewed. Constitutional:       General: She is not in acute distress. Appearance: Normal appearance. She is mildly ill-appearing. HENT: unremarkable, nasal O2   Neck:      Musculoskeletal: Neck supple. Cardiovascular:      Rate and Rhythm: Normal rate and regular rhythm. Heart sounds: No murmur. Pulmonary: clear bilaterally   Abdominal:      Palpations: Abdomen is soft. Tenderness: There is no abdominal tenderness. There is no guarding. Genitourinary:     Comments: No [place]  Skin:     Findings: No rash. Neurological:      General: No focal deficit present. Mental Status: She is alert and oriented to person, place, and time. Psychiatric:         Mood and Affect: Mood normal.         Behavior: Behavior normal.         Thought Content:  Thought content normal.         Judgment: Judgment normal.     Lab/Data Review:     WBC 12,900   <287 <402  Ferritin 350 <377  CRP 4.10 <8.69 <10.50    Blood cultures (11/22) No growth at 2 days  Blood cultures (11/22) No growth at 1 day    CT Chest (11/23) Groundglass opacities in all lobes concerning for infection or  inflammatory process. Covid-19 pneumonia should be considered in the  appropriate clinical setting. Other findings as above. Assessment:     Active Problems:    COVID-19 (11/22/2020)    1. Covid-19 infection with probable pneumonitis. 2. Elevated CRP and LDH, probably secondary to #1  3. Acute hypoxic respiratory failure, on nasal O2     Comment:  CT Chest supports clinical diagnosis of Covid pneumonia. LDH and CRP decreasing. Plan:   1. Continue Azithromycin, Ceftriaxone, Remdesivir, Decadron, Convalescent plasma, status post Actemra x 2  2. In am, repeat CRP, LDH, done  4.  Follow-up blood cultures          Signed By: Dodie De La Garza MD     November 24, 2020

## 2020-11-24 NOTE — ROUTINE PROCESS
Bedside shift change report given to Gregoria Conde RN (oncoming nurse) by Mariam Ziegler RN (offgoing nurse). Report included the following information SBAR and MAR.

## 2020-11-24 NOTE — CONSULTS
Consult  Pulmonary, Critical Care    Name: Jane Das MRN: [id number]   : 1960 Hospital: [address] [place] [address]   Date: 2020  Admission date: 2020 Hospital Day: 3       Subjective/Interval History:   Patient seen on medical floor. She is currently wearing oxygen. States she tested positive for COVID-19 a little under a week ago. Had been feeling fine but yesterday developed nonproductive cough and shortness of breath particularly with exertion. Felt slightly feverish but has not had any alteration in her sense of smell or taste no appetite suppression.  wearing nasal oxygen states she feels fairly well minimal cough   feels fairly well states her appetite is better has had no cough today    Hospital Problems  Never Reviewed          Codes Class Noted POA    COVID-19 ICD-10-CM: U07.1  ICD-9-CM: 079.89  2020 Unknown              IMPRESSION:   1. COVID-19 infection  2. COVID-19 pneumonitis Marked groundglass infiltrates on CT chest  3. Acute hypoxic respiratory failure continue nasal oxygen at 2 L  4. Hypokalemia repleted      RECOMMENDATIONS/PLAN:   1. Currently on Remdesivir has had 2 doses Actemra and continues on Decadron. Extensive infiltrate on CT of the chest will add Actemra  2. Due to hypercoagulable state with COVID-19 will fully anticoagulate with Eliquis  3. We will repeat chest x-ray in a.m.  4. We will check overnight oximetry to see if can come off oxygen          [x] High complexity decision making was performed  [x] See my orders for details      Subjective/Initial History:     I was asked by Keren Talley MD to see Jane Das  a 61 y.o.    female in consultation for a chief complaint of COVID-19 infection with hypoxia and abnormal chest x-ray    Allergies   Allergen Reactions    Ampicillin Itching    Percocet [Oxycodone-Acetaminophen] Itching        MAR reviewed and pertinent medications noted or modified as needed Current Facility-Administered Medications   Medication    ibuprofen (MOTRIN) tablet 600 mg    tocilizumab 400 mg in 0.9% sodium chloride 100 mL infusion    apixaban (ELIQUIS) tablet 10 mg    0.9% sodium chloride infusion 250 mL    0.9% sodium chloride infusion    polyethylene glycol (MIRALAX) packet 17 g    ondansetron (ZOFRAN ODT) tablet 4 mg    Or    ondansetron (ZOFRAN) injection 4 mg    insulin lispro (HUMALOG) injection    glucose chewable tablet 16 g    dextrose (D50W) injection syrg 12.5-25 g    glucagon (GLUCAGEN) injection 1 mg    dexamethasone (DECADRON) 4 mg/mL injection 4 mg    piperacillin-tazobactam (ZOSYN) 3.375 g in 0.9% sodium chloride (MBP/ADV) 100 mL MBP    azithromycin (ZITHROMAX) tablet 500 mg    remdesivir 100 mg in 0.9% sodium chloride 250 mL IVPB    influenza vaccine 2020-21 (4 yrs+)(PF) (FLUCELVAX QUAD) injection 0.5 mL      Patient PCP: Alyse Tuttle PA-C  PMH:  has a past medical history of Diabetes ([facility] [address] GERD (gastroesophageal reflux disease), and Hypertension. PSH:   has a past surgical history that includes hx other surgical.   FHX: family history is not on file. SHX:  reports that she has never smoked. She has never used smokeless tobacco. She reports that she does not drink alcohol or use drugs. Systemic review:  General states her weight is stable has not had any change in her appetite.   May have had a little bit of fever at home  Eyes no double vision momentary blindness  ENT no facial pain over the sinuses or drainage  Musculoskeletal no swollen tender joints no myalgias  Endocrinologic no polyuria polydipsia  Neurologic no seizures or syncope  Gastrointestinal soft has a history of reflux states as long as she takes her medications she is asymptomatic no sour bitter taste at night no nausea vomiting as mentioned her sense of taste is normal  Genitourinary no pain or discomfort on urination no bleeding or drainage  Cardiovascular no ankle edema chest pain or diaphoresis  Respiratory new onset cough yesterday nonproductive no definite fever although she may have felt hot    Objective:     Vital Signs: Telemetry:    normal sinus rhythm Intake/Output:   Visit Vitals  BP (!) 149/88 (BP 1 Location: Left arm, BP Patient Position: At rest)   Pulse 84   Temp 97.9 °F (36.6 °C)   Resp 22   Ht 5' 6\" (1.676 m)   Wt 84.8 kg (186 lb 15.2 oz)   SpO2 94%   Breastfeeding No   BMI 30.17 kg/m²       Temp (24hrs), Av.3 °F (36.8 °C), Min:97.8 °F (36.6 °C), Max:99.1 °F (37.3 °C)        O2 Device: Nasal cannula O2 Flow Rate (L/min): 2 l/min       Wt Readings from Last 4 Encounters:   20 84.8 kg (186 lb 15.2 oz)          Intake/Output Summary (Last 24 hours) at 2020 1356  Last data filed at 2020 0728  Gross per 24 hour   Intake 300 ml   Output    Net 300 ml       Last shift:       07 -  1900  In: 300   Out: -   Last 3 shifts: No intake/output data recorded. Physical Exam:   General:  female; in no apparent distress; on nasal oxygen 2 L  HEENT: NCAT, normal oral mucosa  Eyes: anicteric; conjunctiva clear extraocular movements intact  Neck: no nodes, no JVD no accessory muscle usage  Chest: no deformity,   Cardiac: Regular rate and rhythm no definite murmur no edema  Lungs: Clear anteriorly and laterally right posterior has rales extending intermediate up left side has minimal rales in the left base  Abd: Soft nontender normal bowel sounds no organomegaly  Ext: no edema; no joint swelling;  No clubbing  :clear urine  Neuro: Awake alert oriented speech is clear moves all 4 extremities normally  Psych- no agitation, oriented to person;  Skin: warm, dry, no cyanosis;   Pulses: Brachial radial femoral pulses intact  Capillary: Normal capillary refill    Labs:    Recent Labs     20  0903 20  0735 20  0845   WBC 12.9* 8.9 7.8   HGB 13.2 12.9 12.7   * 394 368     Recent Labs     20  0903 20  0791 11/22/20  0845    141  142 140   K 4.7 3.7  3.7 3.2*    109*  109* 106   CO2 29 21  22 27   * 151*  152* 157*   BUN 18 17  18 11   CREA 0.80 0.71  0.66 0.71   CA 9.6 9.2  9.2 9.3   PHOS  --  3.5  --    LAC  --   --  1.1   ALB 3.0* 2.7*  2.8* 3.0*   ALT 51 61 62     Recent Labs     11/22/20  1525   PH 7.402   PCO2 35   PO2 61*   HCO3 22   FIO2 21.0   Currently on 2 L nasal oxygen oxygen saturation 96%  Recent Labs     11/22/20  0845   TROIQ <0.05     CRP 10.5  Procalcitonin less than 0.05    BNP 15  Imaging:    CXR Results  (Last 48 hours)  11/23 CT chest shows extensive diffuse groundglass infiltrates               11/22/20 0852  XR CHEST SNGL V Final result    Impression:  Impression:    1. Mild pulmonary vascular congestion. 2. Mild bibasilar airspace disease, atelectasis versus developing pneumonia. Narrative:  AP portable chest, 0847 hours. Comparison: 11/12/2015. Findings: The cardiomediastinal silhouette is within normal limits. There is   mild pulmonary vascular congestion. Mild bibasilar airspace disease is noted. There is no pleural effusion or pneumothorax. There are senescent changes within   the spine. Results from [place] encounter on 11/22/20   XR CHEST SNGL V    Narrative AP portable chest, 0847 hours. Comparison: 11/12/2015. Findings: The cardiomediastinal silhouette is within normal limits. There is  mild pulmonary vascular congestion. Mild bibasilar airspace disease is noted. There is no pleural effusion or pneumothorax. There are senescent changes within  the spine. Impression Impression:   1. Mild pulmonary vascular congestion. 2. Mild bibasilar airspace disease, atelectasis versus developing pneumonia. · Discussion COVID-19 test + 5 to 7 days ago  now with  cough and shortness of breath. Chest x-ray has vague changes of bilateral patchy infiltrate.   CT shows extensive groundglass infiltrates throughout the lungs.   Continue Remdesivir Decadron will add Actemra and fully anticoagulate with Kenia Christy MD

## 2020-11-25 ENCOUNTER — APPOINTMENT (OUTPATIENT)
Dept: GENERAL RADIOLOGY | Age: 60
DRG: 177 | End: 2020-11-25
Attending: INTERNAL MEDICINE
Payer: COMMERCIAL

## 2020-11-25 LAB
BLD PROD TYP BPU: NORMAL
BPU ID: NORMAL
CRP SERPL-MCNC: 1.4 MG/DL (ref 0–0.6)
GLUCOSE BLD STRIP.AUTO-MCNC: 240 MG/DL (ref 65–100)
GLUCOSE BLD STRIP.AUTO-MCNC: 327 MG/DL (ref 65–100)
GLUCOSE BLD STRIP.AUTO-MCNC: 444 MG/DL (ref 65–100)
LDH SERPL L TO P-CCNC: 260 U/L (ref 81–246)
PERFORMED BY, TECHID: ABNORMAL
SARS-COV-2, NAA: DETECTED
STATUS OF UNIT,%ST: NORMAL
TRANSFUSION STATUS PATIENT QL: NORMAL
UNIT DIVISION, %UDIV: 0

## 2020-11-25 PROCEDURE — 65270000029 HC RM PRIVATE

## 2020-11-25 PROCEDURE — 94762 N-INVAS EAR/PLS OXIMTRY CONT: CPT

## 2020-11-25 PROCEDURE — 82962 GLUCOSE BLOOD TEST: CPT

## 2020-11-25 PROCEDURE — 74011250637 HC RX REV CODE- 250/637: Performed by: FAMILY MEDICINE

## 2020-11-25 PROCEDURE — 74011000250 HC RX REV CODE- 250: Performed by: INTERNAL MEDICINE

## 2020-11-25 PROCEDURE — 36415 COLL VENOUS BLD VENIPUNCTURE: CPT

## 2020-11-25 PROCEDURE — 74011000258 HC RX REV CODE- 258: Performed by: FAMILY MEDICINE

## 2020-11-25 PROCEDURE — 74011250636 HC RX REV CODE- 250/636: Performed by: FAMILY MEDICINE

## 2020-11-25 PROCEDURE — 83615 LACTATE (LD) (LDH) ENZYME: CPT

## 2020-11-25 PROCEDURE — 74011000258 HC RX REV CODE- 258: Performed by: INTERNAL MEDICINE

## 2020-11-25 PROCEDURE — 74011250637 HC RX REV CODE- 250/637: Performed by: INTERNAL MEDICINE

## 2020-11-25 PROCEDURE — 74011636637 HC RX REV CODE- 636/637: Performed by: FAMILY MEDICINE

## 2020-11-25 PROCEDURE — 71045 X-RAY EXAM CHEST 1 VIEW: CPT

## 2020-11-25 PROCEDURE — 86140 C-REACTIVE PROTEIN: CPT

## 2020-11-25 PROCEDURE — 99232 SBSQ HOSP IP/OBS MODERATE 35: CPT | Performed by: INTERNAL MEDICINE

## 2020-11-25 RX ORDER — DIPHENHYDRAMINE HCL 25 MG
25 CAPSULE ORAL
Status: DISCONTINUED | OUTPATIENT
Start: 2020-11-25 | End: 2020-11-28 | Stop reason: HOSPADM

## 2020-11-25 RX ADMIN — INSULIN LISPRO 5 UNITS: 100 INJECTION, SOLUTION INTRAVENOUS; SUBCUTANEOUS at 22:17

## 2020-11-25 RX ADMIN — INSULIN LISPRO 15 UNITS: 100 INJECTION, SOLUTION INTRAVENOUS; SUBCUTANEOUS at 12:42

## 2020-11-25 RX ADMIN — APIXABAN 10 MG: 5 TABLET, FILM COATED ORAL at 10:00

## 2020-11-25 RX ADMIN — SODIUM CHLORIDE 100 MG: 9 INJECTION, SOLUTION INTRAVENOUS at 13:36

## 2020-11-25 RX ADMIN — INSULIN LISPRO 7 UNITS: 100 INJECTION, SOLUTION INTRAVENOUS; SUBCUTANEOUS at 18:08

## 2020-11-25 RX ADMIN — APIXABAN 10 MG: 5 TABLET, FILM COATED ORAL at 20:13

## 2020-11-25 RX ADMIN — PIPERACILLIN AND TAZOBACTAM 3.38 G: 3; .375 INJECTION, POWDER, LYOPHILIZED, FOR SOLUTION INTRAVENOUS at 05:28

## 2020-11-25 RX ADMIN — DEXAMETHASONE SODIUM PHOSPHATE 4 MG: 4 INJECTION, SOLUTION INTRAMUSCULAR; INTRAVENOUS at 05:28

## 2020-11-25 RX ADMIN — PIPERACILLIN AND TAZOBACTAM 3.38 G: 3; .375 INJECTION, POWDER, LYOPHILIZED, FOR SOLUTION INTRAVENOUS at 12:42

## 2020-11-25 RX ADMIN — DIPHENHYDRAMINE HYDROCHLORIDE 25 MG: 25 CAPSULE ORAL at 12:42

## 2020-11-25 RX ADMIN — DEXAMETHASONE SODIUM PHOSPHATE 4 MG: 4 INJECTION, SOLUTION INTRAMUSCULAR; INTRAVENOUS at 18:08

## 2020-11-25 RX ADMIN — INSULIN LISPRO 4 UNITS: 100 INJECTION, SOLUTION INTRAVENOUS; SUBCUTANEOUS at 10:00

## 2020-11-25 RX ADMIN — IBUPROFEN 600 MG: 600 TABLET ORAL at 10:00

## 2020-11-25 RX ADMIN — PIPERACILLIN AND TAZOBACTAM 3.38 G: 3; .375 INJECTION, POWDER, LYOPHILIZED, FOR SOLUTION INTRAVENOUS at 20:14

## 2020-11-25 RX ADMIN — AZITHROMYCIN MONOHYDRATE 500 MG: 500 TABLET ORAL at 10:00

## 2020-11-25 RX ADMIN — DEXAMETHASONE SODIUM PHOSPHATE 4 MG: 4 INJECTION, SOLUTION INTRAMUSCULAR; INTRAVENOUS at 12:34

## 2020-11-25 NOTE — PROGRESS NOTES
Progress Note    Patient: Cata Acharya MRN: [id number]  SSN: [id number]    YOB: 1960  Age: 61 y.o. Sex: female      Admit Date: 11/22/2020    LOS: 3 days     Subjective:   Patient followed for Covid-19 infection with pneumonia, supported by ground glass changes on CT Chest.  WBC increased likely due to steroids. CRP and ferritin decreasing. She is subjectively improving. Mild cough. Objective:     Vitals:    11/25/20 0536 11/25/20 0800 11/25/20 0841 11/25/20 1245   BP: (!) 150/88  (!) 154/91 (!) 148/88   Pulse: 76 87 95 94   Resp: 20 20 20   Temp: 97.8 °F (36.6 °C)  97.9 °F (36.6 °C) 97.8 °F (36.6 °C)   SpO2: 91%  93% 96%   Weight:       Height:            Intake and Output:  Current Shift: No intake/output data recorded. Last three shifts: 11/23 1901 - 11/25 0700  In: 300   Out: -     Physical Exam:    Vitals signs and nursing note reviewed. Constitutional:       General: She is not in acute distress. Appearance: Normal appearance. She is mildly ill-appearing. HENT: unremarkable, nasal O2   Neck:      Musculoskeletal: Neck supple. Cardiovascular:      Rate and Rhythm: Normal rate and regular rhythm. Heart sounds: No murmur. Pulmonary: clear bilaterally   Abdominal:      Palpations: Abdomen is soft. Tenderness: There is no abdominal tenderness. There is no guarding. Genitourinary:     Comments: No [place]  Skin:     Findings: No rash. Neurological:      General: No focal deficit present. Mental Status: She is alert and oriented to person, place, and time. Psychiatric:         Mood and Affect: Mood normal.         Behavior: Behavior normal.         Thought Content:  Thought content normal.         Judgment: Judgment normal.     Lab/Data Review:     WBC 12,900   <287 <402  Ferritin 350 <377  CRP 4.10 <8.69 <10.50    Blood cultures (11/22) No growth at 2 days  Blood cultures (11/22) No growth at 1 day    CXR (11/25)  Hazy reticular markings throughout the lungs; these are most dense left  mid/lower lung. Perhaps slight improvement in aeration compared to prior  imaging. Cardiac and mediastinal structures unchanged. No pneumothorax or  pleural effusion    Assessment:     Active Problems:    COVID-19 (11/22/2020)    1. Covid-19 infection with probable pneumonitis, Day #4 IV Remdesivir, Zithromax, Decadron, s/p convalescent plasma and Actemra x 2.  2. Elevated CRP and LDH, probably secondary to #1  3. Acute hypoxic respiratory failure, on nasal O2     Comment:  CT Chest supports clinical diagnosis of Covid pneumonia. LDH, ferritin and CRP decreasing. Repeat CXR showing improvement. Leukocytosis likely steroid related. Plan:   1. Continue Azithromycin, Ceftriaxone, Remdesivir, Decadron  2. In am, repeat CRP, LDH, Ferritin, done  4.  Follow-up blood cultures          Signed By: Mamadou Lam MD     November 25, 2020

## 2020-11-25 NOTE — PROGRESS NOTES
General Daily Progress Note          Patient Name:   Wilner Hammer       YOB: 1960       Age:  61 y.o. Admit Date: 11/22/2020      Subjective:       Patient still complaining of shortness of breath on 3 L oxygen by nasal cannula  Saturation 92%        Objective:     Visit Vitals  BP (!) 150/88   Pulse 76   Temp 97.8 °F (36.6 °C)   Resp 20   Ht 5' 6\" (1.676 m)   Wt 84.8 kg (186 lb 15.2 oz)   SpO2 91%   Breastfeeding No   BMI 30.17 kg/m²        Recent Results (from the past 24 hour(s))   CBC WITH AUTOMATED DIFF    Collection Time: 11/24/20  9:03 AM   Result Value Ref Range    WBC 12.9 (H) 3.6 - 11.0 K/uL    RBC 4.68 3.80 - 5.20 M/uL    HGB 13.2 11.5 - 16.0 g/dL    HCT 40.2 35.0 - 47.0 %    MCV 85.9 80.0 - 99.0 FL    MCH 28.2 26.0 - 34.0 PG    MCHC 32.8 30.0 - 36.5 g/dL    RDW 14.0 11.5 - 14.5 %    PLATELET 954 (H) 309 - 400 K/uL    MPV 9.9 8.9 - 12.9 FL    NEUTROPHILS 88 (H) 32 - 75 %    LYMPHOCYTES 6 (L) 12 - 49 %    MONOCYTES 5 5 - 13 %    EOSINOPHILS 0 0 - 7 %    BASOPHILS 0 0 - 1 %    IMMATURE GRANULOCYTES 1 (H) 0.0 - 0.5 %    ABS. NEUTROPHILS 11.5 (H) 1.8 - 8.0 K/UL    ABS. LYMPHOCYTES 0.8 0.8 - 3.5 K/UL    ABS. MONOCYTES 0.6 0.0 - 1.0 K/UL    ABS. EOSINOPHILS 0.0 0.0 - 0.4 K/UL    ABS. BASOPHILS 0.0 0.0 - 0.1 K/UL    ABS. IMM. GRANS. 0.1 (H) 0.00 - 0.04 K/UL    DF AUTOMATED     METABOLIC PANEL, COMPREHENSIVE    Collection Time: 11/24/20  9:03 AM   Result Value Ref Range    Sodium 141 136 - 145 mmol/L    Potassium 4.7 3.5 - 5.1 mmol/L    Chloride 107 97 - 108 mmol/L    CO2 29 21 - 32 mmol/L    Anion gap 5 5 - 15 mmol/L    Glucose 199 (H) 65 - 100 mg/dL    BUN 18 6 - 20 mg/dL    Creatinine 0.80 0.55 - 1.02 mg/dL    BUN/Creatinine ratio 23 (H) 12 - 20      GFR est AA >60 >60 ml/min/1.73m2    GFR est non-AA >60 >60 ml/min/1.73m2    Calcium 9.6 8.5 - 10.1 mg/dL    Bilirubin, total 0.5 0.2 - 1.0 mg/dL    AST (SGOT) 21 15 - 37 U/L    ALT (SGPT) 51 12 - 78 U/L    Alk.  phosphatase 87 45 - 117 U/L Protein, total 8.2 6.4 - 8.2 g/dL    Albumin 3.0 (L) 3.5 - 5.0 g/dL    Globulin 5.2 (H) 2.0 - 4.0 g/dL    A-G Ratio 0.6 (L) 1.1 - 2.2     C REACTIVE PROTEIN, QT    Collection Time: 11/24/20  9:03 AM   Result Value Ref Range    C-Reactive protein 4.10 (H) 0.00 - 0.60 mg/dL   LD    Collection Time: 11/24/20  9:03 AM   Result Value Ref Range     (H) 81 - 246 U/L   FERRITIN    Collection Time: 11/24/20  9:03 AM   Result Value Ref Range    Ferritin 355 (H) 8 - 252 ng/mL   GLUCOSE, POC    Collection Time: 11/24/20 12:16 PM   Result Value Ref Range    Glucose (POC) 362 (H) 65 - 100 mg/dL    Performed by [address] [place] [address], POC    Collection Time: 11/24/20  3:46 PM   Result Value Ref Range    Glucose (POC) 330 (H) 65 - 100 mg/dL    Performed by [address] [place] [address], POC    Collection Time: 11/24/20  9:13 PM   Result Value Ref Range    Glucose (POC) 267 (H) 65 - 100 mg/dL    Performed by Berneice Riedel      [unfilled]      Review of Systems    Constitutional: Negative for chills and fever. HENT: Negative. Eyes: Negative. Respiratory: Negative. Cardiovascular: Negative. Gastrointestinal: Negative for abdominal pain and nausea. Skin: Negative. Neurological: Negative. Physical Exam:      Constitutional: pt is oriented to person, place, and time. HENT:   Head: Normocephalic and atraumatic. Eyes: Pupils are equal, round, and reactive to light. EOM are normal.   Cardiovascular: Normal rate, regular rhythm and normal heart sounds. Pulmonary/Chest: Breath sounds normal. No wheezes. No rales. Exhibits no tenderness. Abdominal: Soft. Bowel sounds are normal. There is no abdominal tenderness. There is no rebound and no guarding. Musculoskeletal: Normal range of motion. Neurological: pt is alert and oriented to person, place, and time.      CT CHEST WO CONT   Final Result   Impression: Groundglass opacities in all lobes concerning for infection or   inflammatory process. Covid-19 pneumonia should be considered in the   appropriate clinical setting. Other findings as above. XR CHEST SNGL V   Final Result   Impression:    1. Mild pulmonary vascular congestion. 2. Mild bibasilar airspace disease, atelectasis versus developing pneumonia. XR CHEST PORT    (Results Pending)        Recent Results (from the past 24 hour(s))   CBC WITH AUTOMATED DIFF    Collection Time: 11/24/20  9:03 AM   Result Value Ref Range    WBC 12.9 (H) 3.6 - 11.0 K/uL    RBC 4.68 3.80 - 5.20 M/uL    HGB 13.2 11.5 - 16.0 g/dL    HCT 40.2 35.0 - 47.0 %    MCV 85.9 80.0 - 99.0 FL    MCH 28.2 26.0 - 34.0 PG    MCHC 32.8 30.0 - 36.5 g/dL    RDW 14.0 11.5 - 14.5 %    PLATELET 140 (H) 318 - 400 K/uL    MPV 9.9 8.9 - 12.9 FL    NEUTROPHILS 88 (H) 32 - 75 %    LYMPHOCYTES 6 (L) 12 - 49 %    MONOCYTES 5 5 - 13 %    EOSINOPHILS 0 0 - 7 %    BASOPHILS 0 0 - 1 %    IMMATURE GRANULOCYTES 1 (H) 0.0 - 0.5 %    ABS. NEUTROPHILS 11.5 (H) 1.8 - 8.0 K/UL    ABS. LYMPHOCYTES 0.8 0.8 - 3.5 K/UL    ABS. MONOCYTES 0.6 0.0 - 1.0 K/UL    ABS. EOSINOPHILS 0.0 0.0 - 0.4 K/UL    ABS. BASOPHILS 0.0 0.0 - 0.1 K/UL    ABS. IMM. GRANS. 0.1 (H) 0.00 - 0.04 K/UL    DF AUTOMATED     METABOLIC PANEL, COMPREHENSIVE    Collection Time: 11/24/20  9:03 AM   Result Value Ref Range    Sodium 141 136 - 145 mmol/L    Potassium 4.7 3.5 - 5.1 mmol/L    Chloride 107 97 - 108 mmol/L    CO2 29 21 - 32 mmol/L    Anion gap 5 5 - 15 mmol/L    Glucose 199 (H) 65 - 100 mg/dL    BUN 18 6 - 20 mg/dL    Creatinine 0.80 0.55 - 1.02 mg/dL    BUN/Creatinine ratio 23 (H) 12 - 20      GFR est AA >60 >60 ml/min/1.73m2    GFR est non-AA >60 >60 ml/min/1.73m2    Calcium 9.6 8.5 - 10.1 mg/dL    Bilirubin, total 0.5 0.2 - 1.0 mg/dL    AST (SGOT) 21 15 - 37 U/L    ALT (SGPT) 51 12 - 78 U/L    Alk.  phosphatase 87 45 - 117 U/L    Protein, total 8.2 6.4 - 8.2 g/dL    Albumin 3.0 (L) 3.5 - 5.0 g/dL    Globulin 5.2 (H) 2.0 - 4.0 g/dL    A-G Ratio 0.6 (L) 1.1 - 2.2     C REACTIVE PROTEIN, QT    Collection Time: 11/24/20  9:03 AM   Result Value Ref Range    C-Reactive protein 4.10 (H) 0.00 - 0.60 mg/dL   LD    Collection Time: 11/24/20  9:03 AM   Result Value Ref Range     (H) 81 - 246 U/L   FERRITIN    Collection Time: 11/24/20  9:03 AM   Result Value Ref Range    Ferritin 355 (H) 8 - 252 ng/mL   GLUCOSE, POC    Collection Time: 11/24/20 12:16 PM   Result Value Ref Range    Glucose (POC) 362 (H) 65 - 100 mg/dL    Performed by [address] [place] [address], POC    Collection Time: 11/24/20  3:46 PM   Result Value Ref Range    Glucose (POC) 330 (H) 65 - 100 mg/dL    Performed by [address] [place] [address], POC    Collection Time: 11/24/20  9:13 PM   Result Value Ref Range    Glucose (POC) 267 (H) 65 - 100 mg/dL    Performed by Rudy Chan        Results     Procedure Component Value Units Date/Time    CULTURE, BLOOD #2 [[id number]] Collected:  11/22/20 1715    Order Status:  Completed Specimen:  Blood Updated:  11/24/20 0812     Special Requests: No Special Requests        Culture result: No growth after 23 hours       CULTURE, BLOOD #1 [[id number]] Collected:  11/22/20 1430    Order Status:  Completed Specimen:  Blood Updated:  11/23/20 0820    CULTURE, BLOOD, PAIRED [[id number]] Collected:  11/22/20 0935    Order Status:  Completed Specimen:  Blood Updated:  11/24/20 0812     Special Requests: No Special Requests        Culture result: No growth 2 days              Labs:     Recent Labs     11/24/20  0903 11/23/20  0735   WBC 12.9* 8.9   HGB 13.2 12.9   HCT 40.2 40.4   * 394     Recent Labs     11/24/20  0903 11/23/20  0735 11/22/20  0845    141  142 140   K 4.7 3.7  3.7 3.2*    109*  109* 106   CO2 29 21  22 27   BUN 18 17  18 11   CREA 0.80 0.71  0.66 0.71   * 151*  152* 157*   CA 9.6 9.2  9.2 9.3   PHOS  --  3.5  --      Recent Labs     11/24/20  0903 11/23/20  0735 11/22/20  0845   ALT 51 61 62   AP 87 76 74   TBILI 0.5 0.5 0.9   TP 8.2 7.9 8.1   ALB 3.0* 2.7*  2.8* 3.0*   GLOB 5.2* 5.2* 5.1*     No results for input(s): INR, PTP, APTT, INREXT, INREXT in the last 72 hours.    Recent Labs     11/24/20  0903 11/23/20  0735   FERR 355* 350*      No results found for: FOL, RBCF   Recent Labs     11/22/20  1525   PH 7.402   PCO2 35   PO2 61*     Recent Labs     11/22/20  0845   TROIQ <0.05     No results found for: CHOL, CHOLX, CHLST, CHOLV, HDL, HDLP, LDL, LDLC, DLDLP, TGLX, TRIGL, TRIGP, CHHD, CHHDX  Lab Results   Component Value Date/Time    Glucose (POC) 267 (H) 11/24/2020 09:13 PM    Glucose (POC) 330 (H) 11/24/2020 03:46 PM    Glucose (POC) 362 (H) 11/24/2020 12:16 PM    Glucose (POC) 212 (H) 11/24/2020 08:27 AM    Glucose (POC) 239 (H) 11/23/2020 07:43 PM     No results found for: COLOR, APPRN, SPGRU, REFSG, MITCH, PROTU, GLUCU, KETU, BILU, UROU, GARDEN[place], LEUKU, GLUKE, EPSU, BACTU, WBCU, RBCU, CASTS, UCRY      Assessment:     COVID-19 positive  Acute hypoxemia  Type 2 diabetes  Hypertension  Hypokalemia    Due to hypercoagulable state with COVID-19 starting Eliquis      Plan:     Continue Zosyn and Zithromax and and Decadron  Start IV remdesivir 100 mg daily   order convalescent plasma      Monitor oxygen saturation  Monitor blood sugars        Current Facility-Administered Medications:     ibuprofen (MOTRIN) tablet 600 mg, 600 mg, Oral, Q6H PRN, Orlin Kelly MD, 600 mg at 11/24/20 1428    apixaban (ELIQUIS) tablet 10 mg, 10 mg, Oral, BID, Micki Mosqueda MD, 10 mg at 11/24/20 2117    0.9% sodium chloride infusion 250 mL, 250 mL, IntraVENous, PRN, Sujit Meneses MD    0.9% sodium chloride infusion, 25 mL/hr, IntraVENous, CONTINUOUS, Micki Mosqueda MD, Last Rate: 25 mL/hr at 11/24/20 1900, 25 mL/hr at 11/24/20 1900    polyethylene glycol (MIRALAX) packet 17 g, 17 g, Oral, DAILY PRN, Padma Kelly MD    ondansetron (ZOFRAN ODT) tablet 4 mg, 4 mg, Oral, Q8H PRN **OR** ondansetron (ZOFRAN) injection 4 mg, 4 mg, IntraVENous, Q6H PRN, Larissa Kelly MD    insulin lispro (HUMALOG) injection, , SubCUTAneous, AC&HS, Orlin Kelly MD, 4 Units at 11/24/20 2116    glucose chewable tablet 16 g, 4 Tab, Oral, PRN, Larissa Kelly MD    dextrose (D50W) injection syrg 12.5-25 g, 25-50 mL, IntraVENous, PRN, Larissa Kelly MD    glucagon (GLUCAGEN) injection 1 mg, 1 mg, IntraMUSCular, PRN, Larissa Kelly MD    dexamethasone (DECADRON) 4 mg/mL injection 4 mg, 4 mg, IntraVENous, Q6H, Orlin Kelly MD, 4 mg at 11/25/20 0528    piperacillin-tazobactam (ZOSYN) 3.375 g in 0.9% sodium chloride (MBP/ADV) 100 mL MBP, 3.375 g, IntraVENous, Q8H, Orlin Kelly MD, Last Rate: 200 mL/hr at 11/25/20 0528, 3.375 g at 11/25/20 0528    azithromycin (ZITHROMAX) tablet 500 mg, 500 mg, Oral, DAILY, Orlin Kelly MD, 500 mg at 11/24/20 7394    remdesivir 100 mg in 0.9% sodium chloride 250 mL IVPB, 100 mg, IntraVENous, Q24H, Brendan Nguyễn MD, 100 mg at 11/24/20 1428    influenza vaccine 2020-21 (4 yrs+)(PF) (FLUCELVAX QUAD) injection 0.5 mL, 0.5 mL, IntraMUSCular, PRIOR TO DISCHARGE, Jeffery King MD

## 2020-11-25 NOTE — PROGRESS NOTES
Consult  Pulmonary, Critical Care    Name: Avinash Burger MRN: [id number]   : 1960 Hospital: [address] [place] [address]   Date: 2020  Admission date: 2020 Hospital Day: 4       Subjective/Interval History:   Patient seen on medical floor. She is currently wearing oxygen. States she tested positive for COVID-19 a little under a week ago. Had been feeling fine but yesterday developed nonproductive cough and shortness of breath particularly with exertion. Felt slightly feverish but has not had any alteration in her sense of smell or taste no appetite suppression.  wearing nasal oxygen states she feels fairly well minimal cough   feels fairly well states her appetite is better has had no cough today   feels fine today no specific complaints states she sat up in the chair yesterday without difficulty. Chest x-ray today minimal improvement overnight oximetry shows minimal oxygen desaturation when off oxygen    Hospital Problems  Never Reviewed          Codes Class Noted POA    COVID-19 ICD-10-CM: U07.1  ICD-9-CM: 079.89  2020 Unknown              IMPRESSION:   1. COVID-19 infection  2. COVID-19 pneumonitis Marked groundglass infiltrates on CT chest x-ray seems mildly improved  3. Acute hypoxic respiratory failure continue nasal oxygen at 2 L  4. Hypokalemia repleted      RECOMMENDATIONS/PLAN:   1. Currently on Remdesivir has had 2 doses Actemra and continues on Decadron. Extensive infiltrate on CT of the chest   2. Due to hypercoagulable state with COVID-19 will fully anticoagulate with Eliquis  3. Minimal oxygen desaturation on overnight oximetry 9 minutes below 88%. Will maintain oxygen for the time being          [x] High complexity decision making was performed  [x] See my orders for details      Subjective/Initial History:     I was asked by Matt Armenta MD to see Avinash Burger  a 61 y.o.    female in consultation for a chief complaint of COVID-19 infection with hypoxia and abnormal chest x-ray    Allergies   Allergen Reactions    Ampicillin Itching    Percocet [Oxycodone-Acetaminophen] Itching        MAR reviewed and pertinent medications noted or modified as needed     Current Facility-Administered Medications   Medication    diphenhydrAMINE (BENADRYL) capsule 25 mg    ibuprofen (MOTRIN) tablet 600 mg    apixaban (ELIQUIS) tablet 10 mg    0.9% sodium chloride infusion 250 mL    0.9% sodium chloride infusion    polyethylene glycol (MIRALAX) packet 17 g    ondansetron (ZOFRAN ODT) tablet 4 mg    Or    ondansetron (ZOFRAN) injection 4 mg    insulin lispro (HUMALOG) injection    glucose chewable tablet 16 g    dextrose (D50W) injection syrg 12.5-25 g    glucagon (GLUCAGEN) injection 1 mg    dexamethasone (DECADRON) 4 mg/mL injection 4 mg    piperacillin-tazobactam (ZOSYN) 3.375 g in 0.9% sodium chloride (MBP/ADV) 100 mL MBP    azithromycin (ZITHROMAX) tablet 500 mg    remdesivir 100 mg in 0.9% sodium chloride 250 mL IVPB    influenza vaccine 2020-21 (4 yrs+)(PF) (FLUCELVAX QUAD) injection 0.5 mL      Patient PCP: Xavier Swain PA-C  PMH:  has a past medical history of Diabetes ([address] GERD (gastroesophageal reflux disease), and Hypertension. PSH:   has a past surgical history that includes hx other surgical.   FHX: family history is not on file. SHX:  reports that she has never smoked. She has never used smokeless tobacco. She reports that she does not drink alcohol or use drugs. Systemic review:  General states her weight is stable has not had any change in her appetite.   May have had a little bit of fever at home  Eyes no double vision momentary blindness  ENT no facial pain over the sinuses or drainage  Musculoskeletal no swollen tender joints no myalgias  Endocrinologic no polyuria polydipsia  Neurologic no seizures or syncope  Gastrointestinal soft has a history of reflux states as long as she takes her medications she is asymptomatic no sour bitter taste at night no nausea vomiting as mentioned her sense of taste is normal  Genitourinary no pain or discomfort on urination no bleeding or drainage  Cardiovascular no ankle edema chest pain or diaphoresis  Respiratory new onset cough yesterday nonproductive no definite fever although she may have felt hot    Objective:     Vital Signs: Telemetry:    normal sinus rhythm Intake/Output:   Visit Vitals  BP (!) 154/91 (BP 1 Location: Left arm, BP Patient Position: At rest)   Pulse 95   Temp 97.9 °F (36.6 °C)   Resp 20   Ht 5' 6\" (1.676 m)   Wt 84.8 kg (186 lb 15.2 oz)   SpO2 93%   Breastfeeding No   BMI 30.17 kg/m²       Temp (24hrs), Av.9 °F (36.6 °C), Min:97.8 °F (36.6 °C), Max:98.1 °F (36.7 °C)        O2 Device: Nasal cannula O2 Flow Rate (L/min): 2 l/min       Wt Readings from Last 4 Encounters:   20 84.8 kg (186 lb 15.2 oz)        No intake or output data in the 24 hours ending 20 1131    Last shift:      No intake/output data recorded. Last 3 shifts:  1901 -  0700  In: 300   Out: -        Physical Exam:   General:  female; in no apparent distress; on nasal oxygen 2 L  HEENT: NCAT, normal oral mucosa  Eyes: anicteric; conjunctiva clear extraocular movements intact  Neck: no nodes, no JVD no accessory muscle usage  Chest: no deformity,   Cardiac: Regular rate and rhythm no definite murmur no edema  Lungs: Clear anteriorly and laterally right posterior has rales extending shelter up left side has minimal rales in the left base  Abd: Soft nontender normal bowel sounds no organomegaly  Ext: no edema; no joint swelling;  No clubbing  :clear urine  Neuro: Awake alert oriented speech is clear moves all 4 extremities normally  Psych- no agitation, oriented to person;  Skin: warm, dry, no cyanosis;   Pulses: Brachial radial femoral pulses intact  Capillary: Normal capillary refill    Labs:    Recent Labs     20  0903 20  0735   WBC 12.9* 8.9   HGB 13.2 12.9   * 394     Recent Labs     11/24/20  0903 11/23/20  0735    141  142   K 4.7 3.7  3.7    109*  109*   CO2 29 21  22   * 151*  152*   BUN 18 17  18   CREA 0.80 0.71  0.66   CA 9.6 9.2  9.2   PHOS  --  3.5   ALB 3.0* 2.7*  2.8*   ALT 51 61     Recent Labs     11/22/20  1525   PH 7.402   PCO2 35   PO2 61*   HCO3 22   FIO2 21.0   Currently on 2 L nasal oxygen oxygen saturation 96%  11/25 overnight oximetry with low oxygen saturation 84% with 9 minutes below 88%  CRP 10.5  Procalcitonin less than 0.05    BNP 15  Imaging:    CXR Results  (Last 48 hours)  11/23 CT chest shows extensive diffuse groundglass infiltrates               11/22/20 0852  XR CHEST SNGL V Final result    Impression:  Impression:    1. Mild pulmonary vascular congestion. 2. Mild bibasilar airspace disease, atelectasis versus developing pneumonia. Narrative:  AP portable chest, 0847 hours. Comparison: 11/12/2015. Findings: The cardiomediastinal silhouette is within normal limits. There is   mild pulmonary vascular congestion. Mild bibasilar airspace disease is noted. There is no pleural effusion or pneumothorax. There are senescent changes within   the spine. Results from [place] encounter on 11/22/20   XR CHEST PORT    Narrative Chest single view. Comparison single view chest November 22, 2020. Hazy reticular markings throughout the lungs; these are most dense left  mid/lower lung. Perhaps slight improvement in aeration compared to prior  imaging. Cardiac and mediastinal structures unchanged. No pneumothorax or  pleural effusion. XR CHEST SNGL V    Narrative AP portable chest, 0847 hours. Comparison: 11/12/2015. Findings: The cardiomediastinal silhouette is within normal limits. There is  mild pulmonary vascular congestion. Mild bibasilar airspace disease is noted. There is no pleural effusion or pneumothorax.  There are senescent changes within  the spine. Impression Impression:   1. Mild pulmonary vascular congestion. 2. Mild bibasilar airspace disease, atelectasis versus developing pneumonia. · Discussion COVID-19 test + 5 to 7 days ago  now with  cough and shortness of breath. Chest x-ray has vague changes of bilateral patchy infiltrate. CT shows extensive groundglass infiltrates throughout the lungs.   Continue Remdesivir Decadron will add Actemra and fully anticoagulate with Kenia Borrego MD

## 2020-11-25 NOTE — PROGRESS NOTES
Bedside shift change report given to Ruthie Anaya RN (oncoming nurse) by Marvin Parsons RN (offgoing nurse). Report included the following information SBAR, Intake/Output, MAR and Recent Results.

## 2020-11-26 LAB
GLUCOSE BLD STRIP.AUTO-MCNC: 194 MG/DL (ref 65–100)
GLUCOSE BLD STRIP.AUTO-MCNC: 309 MG/DL (ref 65–100)
GLUCOSE BLD STRIP.AUTO-MCNC: 328 MG/DL (ref 65–100)
GLUCOSE BLD STRIP.AUTO-MCNC: 392 MG/DL (ref 65–100)
GLUCOSE BLD STRIP.AUTO-MCNC: 550 MG/DL (ref 65–100)
PERFORMED BY, TECHID: ABNORMAL

## 2020-11-26 PROCEDURE — 74011250636 HC RX REV CODE- 250/636: Performed by: INTERNAL MEDICINE

## 2020-11-26 PROCEDURE — 82962 GLUCOSE BLOOD TEST: CPT

## 2020-11-26 PROCEDURE — 74011636637 HC RX REV CODE- 636/637: Performed by: FAMILY MEDICINE

## 2020-11-26 PROCEDURE — 74011000258 HC RX REV CODE- 258: Performed by: INTERNAL MEDICINE

## 2020-11-26 PROCEDURE — 74011250636 HC RX REV CODE- 250/636: Performed by: FAMILY MEDICINE

## 2020-11-26 PROCEDURE — 74011000250 HC RX REV CODE- 250: Performed by: INTERNAL MEDICINE

## 2020-11-26 PROCEDURE — 99232 SBSQ HOSP IP/OBS MODERATE 35: CPT | Performed by: INTERNAL MEDICINE

## 2020-11-26 PROCEDURE — 65270000029 HC RM PRIVATE

## 2020-11-26 PROCEDURE — 74011250637 HC RX REV CODE- 250/637: Performed by: FAMILY MEDICINE

## 2020-11-26 PROCEDURE — 74011000258 HC RX REV CODE- 258: Performed by: FAMILY MEDICINE

## 2020-11-26 PROCEDURE — 74011250637 HC RX REV CODE- 250/637: Performed by: INTERNAL MEDICINE

## 2020-11-26 RX ORDER — INSULIN LISPRO 100 [IU]/ML
10 INJECTION, SOLUTION INTRAVENOUS; SUBCUTANEOUS ONCE
Status: COMPLETED | OUTPATIENT
Start: 2020-11-26 | End: 2020-11-26

## 2020-11-26 RX ORDER — INSULIN LISPRO 100 [IU]/ML
3 INJECTION, SOLUTION INTRAVENOUS; SUBCUTANEOUS ONCE
Status: COMPLETED | OUTPATIENT
Start: 2020-11-26 | End: 2020-11-26

## 2020-11-26 RX ORDER — FUROSEMIDE 10 MG/ML
40 INJECTION INTRAMUSCULAR; INTRAVENOUS ONCE
Status: COMPLETED | OUTPATIENT
Start: 2020-11-26 | End: 2020-11-26

## 2020-11-26 RX ADMIN — FUROSEMIDE 40 MG: 10 INJECTION, SOLUTION INTRAMUSCULAR; INTRAVENOUS at 17:16

## 2020-11-26 RX ADMIN — INSULIN LISPRO 5 UNITS: 100 INJECTION, SOLUTION INTRAVENOUS; SUBCUTANEOUS at 23:50

## 2020-11-26 RX ADMIN — INSULIN LISPRO 3 UNITS: 100 INJECTION, SOLUTION INTRAVENOUS; SUBCUTANEOUS at 16:30

## 2020-11-26 RX ADMIN — APIXABAN 10 MG: 5 TABLET, FILM COATED ORAL at 08:47

## 2020-11-26 RX ADMIN — APIXABAN 10 MG: 5 TABLET, FILM COATED ORAL at 20:43

## 2020-11-26 RX ADMIN — AZITHROMYCIN MONOHYDRATE 500 MG: 500 TABLET ORAL at 08:47

## 2020-11-26 RX ADMIN — INSULIN LISPRO 10 UNITS: 100 INJECTION, SOLUTION INTRAVENOUS; SUBCUTANEOUS at 14:47

## 2020-11-26 RX ADMIN — INSULIN LISPRO 15 UNITS: 100 INJECTION, SOLUTION INTRAVENOUS; SUBCUTANEOUS at 13:03

## 2020-11-26 RX ADMIN — DEXAMETHASONE SODIUM PHOSPHATE 4 MG: 4 INJECTION, SOLUTION INTRAMUSCULAR; INTRAVENOUS at 06:26

## 2020-11-26 RX ADMIN — DEXAMETHASONE SODIUM PHOSPHATE 4 MG: 4 INJECTION, SOLUTION INTRAMUSCULAR; INTRAVENOUS at 12:40

## 2020-11-26 RX ADMIN — DIPHENHYDRAMINE HYDROCHLORIDE 25 MG: 25 CAPSULE ORAL at 20:42

## 2020-11-26 RX ADMIN — PIPERACILLIN AND TAZOBACTAM 3.38 G: 3; .375 INJECTION, POWDER, LYOPHILIZED, FOR SOLUTION INTRAVENOUS at 04:24

## 2020-11-26 RX ADMIN — INSULIN LISPRO 3 UNITS: 100 INJECTION, SOLUTION INTRAVENOUS; SUBCUTANEOUS at 13:00

## 2020-11-26 RX ADMIN — PIPERACILLIN AND TAZOBACTAM 3.38 G: 3; .375 INJECTION, POWDER, LYOPHILIZED, FOR SOLUTION INTRAVENOUS at 13:17

## 2020-11-26 RX ADMIN — SODIUM CHLORIDE 100 MG: 9 INJECTION, SOLUTION INTRAVENOUS at 15:03

## 2020-11-26 RX ADMIN — PIPERACILLIN AND TAZOBACTAM 3.38 G: 3; .375 INJECTION, POWDER, LYOPHILIZED, FOR SOLUTION INTRAVENOUS at 20:43

## 2020-11-26 RX ADMIN — DEXAMETHASONE SODIUM PHOSPHATE 4 MG: 4 INJECTION, SOLUTION INTRAMUSCULAR; INTRAVENOUS at 00:48

## 2020-11-26 RX ADMIN — DEXAMETHASONE SODIUM PHOSPHATE 4 MG: 4 INJECTION, SOLUTION INTRAMUSCULAR; INTRAVENOUS at 23:50

## 2020-11-26 RX ADMIN — DEXAMETHASONE SODIUM PHOSPHATE 4 MG: 4 INJECTION, SOLUTION INTRAMUSCULAR; INTRAVENOUS at 17:09

## 2020-11-26 RX ADMIN — INSULIN LISPRO 10 UNITS: 100 INJECTION, SOLUTION INTRAVENOUS; SUBCUTANEOUS at 08:52

## 2020-11-26 NOTE — PROGRESS NOTES
Consult  Pulmonary, Critical Care    Name: Mirian Rosado MRN: [id number]   : 1960 Hospital: [address] [place] [address]   Date: 2020  Admission date: 2020 Hospital Day: 5       Subjective/Interval History:   Patient seen on medical floor. She is currently wearing oxygen. States she tested positive for COVID-19 a little under a week ago. Had been feeling fine but yesterday developed nonproductive cough and shortness of breath particularly with exertion. Felt slightly feverish but has not had any alteration in her sense of smell or taste no appetite suppression.  wearing nasal oxygen states she feels fairly well minimal cough   feels fairly well states her appetite is better has had no cough today   feels fine today no specific complaints states she sat up in the chair yesterday without difficulty. Chest x-ray today minimal improvement overnight oximetry shows minimal oxygen desaturation when off oxygen   feels better currently on room air oxygen saturation 94%    Hospital Problems  Never Reviewed          Codes Class Noted POA    COVID-19 ICD-10-CM: U07.1  ICD-9-CM: 079.89  2020 Unknown              IMPRESSION:   1. COVID-19 infection  2. COVID-19 pneumonitis Marked groundglass infiltrates on CT chest x-ray seems mildly improved  3. Acute hypoxic respiratory failure currently on room air  4. Hypokalemia repleted      RECOMMENDATIONS/PLAN:   1. Today will complete 5 days of remdesivir has had 2 doses Actemra and continues on Decadron. Extensive infiltrate on CT of the chest   2. Due to hypercoagulable state with COVID-19 will fully anticoagulate with Eliquis  3. Minimal oxygen desaturation on overnight oximetry 9 minutes below 88%. [x] High complexity decision making was performed  [x] See my orders for details      Subjective/Initial History:     I was asked by Talia Duran MD to see Mirian Rosado  a 61 y.o.    female in consultation for a chief complaint of COVID-19 infection with hypoxia and abnormal chest x-ray    Allergies   Allergen Reactions    Ampicillin Itching    Percocet [Oxycodone-Acetaminophen] Itching        MAR reviewed and pertinent medications noted or modified as needed     Current Facility-Administered Medications   Medication    diphenhydrAMINE (BENADRYL) capsule 25 mg    ibuprofen (MOTRIN) tablet 600 mg    apixaban (ELIQUIS) tablet 10 mg    0.9% sodium chloride infusion 250 mL    0.9% sodium chloride infusion    polyethylene glycol (MIRALAX) packet 17 g    ondansetron (ZOFRAN ODT) tablet 4 mg    Or    ondansetron (ZOFRAN) injection 4 mg    insulin lispro (HUMALOG) injection    glucose chewable tablet 16 g    dextrose (D50W) injection syrg 12.5-25 g    glucagon (GLUCAGEN) injection 1 mg    dexamethasone (DECADRON) 4 mg/mL injection 4 mg    piperacillin-tazobactam (ZOSYN) 3.375 g in 0.9% sodium chloride (MBP/ADV) 100 mL MBP    azithromycin (ZITHROMAX) tablet 500 mg    influenza vaccine 2020-21 (4 yrs+)(PF) (FLUCELVAX QUAD) injection 0.5 mL      Patient PCP: Trae Quijano PA-C  PMH:  has a past medical history of Diabetes ([address] GERD (gastroesophageal reflux disease), and Hypertension. PSH:   has a past surgical history that includes hx other surgical.   FHX: family history is not on file. SHX:  reports that she has never smoked. She has never used smokeless tobacco. She reports that she does not drink alcohol or use drugs. Systemic review:  General states her weight is stable has not had any change in her appetite.   May have had a little bit of fever at home  Eyes no double vision momentary blindness  ENT no facial pain over the sinuses or drainage  Musculoskeletal no swollen tender joints no myalgias  Endocrinologic no polyuria polydipsia  Neurologic no seizures or syncope  Gastrointestinal soft has a history of reflux states as long as she takes her medications she is asymptomatic no sour bitter taste at night no nausea vomiting as mentioned her sense of taste is normal  Genitourinary no pain or discomfort on urination no bleeding or drainage  Cardiovascular no ankle edema chest pain or diaphoresis  Respiratory new onset cough yesterday nonproductive no definite fever although she may have felt hot    Objective:     Vital Signs: Telemetry:    normal sinus rhythm Intake/Output:   Visit Vitals  /85 (BP 1 Location: Left arm, BP Patient Position: At rest)   Pulse 82   Temp 98 °F (36.7 °C)   Resp 18   Ht 5' 6\" (1.676 m)   Wt 84.8 kg (186 lb 15.2 oz)   SpO2 91%   Breastfeeding No   BMI 30.17 kg/m²       Temp (24hrs), Av.8 °F (36.6 °C), Min:97.5 °F (36.4 °C), Max:98 °F (36.7 °C)        O2 Device: Nasal cannula O2 Flow Rate (L/min): 2 l/min       Wt Readings from Last 4 Encounters:   20 84.8 kg (186 lb 15.2 oz)        No intake or output data in the 24 hours ending 20 1624    Last shift:      No intake/output data recorded. Last 3 shifts: No intake/output data recorded. Physical Exam:   General:  female; in no apparent distress; on nasal oxygen 2 L  HEENT: NCAT, normal oral mucosa  Eyes: anicteric; conjunctiva clear extraocular movements intact  Neck: no nodes, no JVD no accessory muscle usage  Chest: no deformity,   Cardiac: Regular rate and rhythm no definite murmur no edema  Lungs: Clear anteriorly and laterally right posterior has rales extending long-term up left side has minimal rales in the left base  Abd: Soft nontender normal bowel sounds no organomegaly  Ext: no edema; no joint swelling;  No clubbing  :clear urine  Neuro: Awake alert oriented speech is clear moves all 4 extremities normally  Psych- no agitation, oriented to person;  Skin: warm, dry, no cyanosis;   Pulses: Brachial radial femoral pulses intact  Capillary: Normal capillary refill    Labs:    Recent Labs     20  0903   WBC 12.9*   HGB 13.2   *     Recent Labs 11/24/20  0903      K 4.7      CO2 29   *   BUN 18   CREA 0.80   CA 9.6   ALB 3.0*   ALT 51     No results for input(s): PH, PCO2, PO2, HCO3, FIO2 in the last 72 hours. Currently on 2 L nasal oxygen oxygen saturation 96%  11/25 overnight oximetry with low oxygen saturation 84% with 9 minutes below 88%  CRP 10.5  Procalcitonin less than 0.05    BNP 15  Imaging:    CXR Results  (Last 48 hours)  11/23 CT chest shows extensive diffuse groundglass infiltrates               11/22/20 0852  XR CHEST SNGL V Final result    Impression:  Impression:    1. Mild pulmonary vascular congestion. 2. Mild bibasilar airspace disease, atelectasis versus developing pneumonia. Narrative:  AP portable chest, 0847 hours. Comparison: 11/12/2015. Findings: The cardiomediastinal silhouette is within normal limits. There is   mild pulmonary vascular congestion. Mild bibasilar airspace disease is noted. There is no pleural effusion or pneumothorax. There are senescent changes within   the spine. Results from [place] encounter on 11/22/20   XR CHEST PORT    Narrative Chest single view. Comparison single view chest November 22, 2020. Hazy reticular markings throughout the lungs; these are most dense left  mid/lower lung. Perhaps slight improvement in aeration compared to prior  imaging. Cardiac and mediastinal structures unchanged. No pneumothorax or  pleural effusion. XR CHEST SNGL V    Narrative AP portable chest, 0847 hours. Comparison: 11/12/2015. Findings: The cardiomediastinal silhouette is within normal limits. There is  mild pulmonary vascular congestion. Mild bibasilar airspace disease is noted. There is no pleural effusion or pneumothorax. There are senescent changes within  the spine. Impression Impression:   1. Mild pulmonary vascular congestion. 2. Mild bibasilar airspace disease, atelectasis versus developing pneumonia.      · Discussion COVID-19 test + 5 to 7 days ago  now with  cough and shortness of breath. Chest x-ray has vague changes of bilateral patchy infiltrate. CT shows extensive groundglass infiltrates throughout the lungs.   Continue Remdesivir Decadron will add Actemra and fully anticoagulate with Kenia More MD

## 2020-11-26 NOTE — PROGRESS NOTES
Problem: Risk for Spread of Infection  Goal: Prevent transmission of infectious organism to others  Description: Prevent the transmission of infectious organisms to other patients, staff members, and visitors. Outcome: Progressing Towards Goal     Problem: Falls - Risk of  Goal: *Absence of Falls  Description: Document Hanh Palacios Fall Risk and appropriate interventions in the flowsheet.   Outcome: Progressing Towards Goal  Note: Fall Risk Interventions:            Medication Interventions: Patient to call before getting OOB

## 2020-11-26 NOTE — PROGRESS NOTES
Bedside shift change report given to Jacqueline Magallanes RN (oncoming nurse) by Radhika Avila RN (offgoing nurse). Report included the following information SBAR, MAR and Recent Results.

## 2020-11-26 NOTE — PROGRESS NOTES
General Daily Progress Note          Patient Name:   Samson Dailey       YOB: 1960       Age:  61 y.o. Admit Date: 11/22/2020      Subjective:       Patient still complaining of shortness of breath on 2 L oxygen by nasal cannula  Saturation 92%        Objective:     Visit Vitals  /85 (BP 1 Location: Left arm, BP Patient Position: At rest)   Pulse 82   Temp 98 °F (36.7 °C)   Resp 18   Ht 5' 6\" (1.676 m)   Wt 84.8 kg (186 lb 15.2 oz)   SpO2 91%   Breastfeeding No   BMI 30.17 kg/m²        Recent Results (from the past 24 hour(s))   GLUCOSE, POC    Collection Time: 11/25/20  8:24 PM   Result Value Ref Range    Glucose (POC) 327 (H) 65 - 100 mg/dL    Performed by Mary Reid    C REACTIVE PROTEIN, QT    Collection Time: 11/25/20  9:14 PM   Result Value Ref Range    C-Reactive protein 1.40 (H) 0.00 - 0.60 mg/dL   LD    Collection Time: 11/25/20  9:14 PM   Result Value Ref Range     (H) 81 - 246 U/L   GLUCOSE, POC    Collection Time: 11/26/20  8:45 AM   Result Value Ref Range    Glucose (POC) 309 (H) 65 - 100 mg/dL    Performed by Evorn Chyle    GLUCOSE, POC    Collection Time: 11/26/20 12:30 PM   Result Value Ref Range    Glucose (POC) 550 (H) 65 - 100 mg/dL    Performed by Evorn Chyle    GLUCOSE, POC    Collection Time: 11/26/20  2:34 PM   Result Value Ref Range    Glucose (POC) 392 (H) 65 - 100 mg/dL    Performed by Sanju Dennis      [unfilled]      Review of Systems    Constitutional: Negative for chills and fever. HENT: Negative. Eyes: Negative. Respiratory: Negative. Cardiovascular: Negative. Gastrointestinal: Negative for abdominal pain and nausea. Skin: Negative. Neurological: Negative. Physical Exam:      Constitutional: pt is oriented to person, place, and time. HENT:   Head: Normocephalic and atraumatic. Eyes: Pupils are equal, round, and reactive to light.  EOM are normal.   Cardiovascular: Normal rate, regular rhythm and normal heart sounds. Pulmonary/Chest: Breath sounds normal. No wheezes. No rales. Exhibits no tenderness. Abdominal: Soft. Bowel sounds are normal. There is no abdominal tenderness. There is no rebound and no guarding. Musculoskeletal: Normal range of motion. Neurological: pt is alert and oriented to person, place, and time. XR CHEST PORT   Final Result      CT CHEST WO CONT   Final Result   Impression: Groundglass opacities in all lobes concerning for infection or   inflammatory process. Covid-19 pneumonia should be considered in the   appropriate clinical setting. Other findings as above. XR CHEST SNGL V   Final Result   Impression:    1. Mild pulmonary vascular congestion. 2. Mild bibasilar airspace disease, atelectasis versus developing pneumonia.            Recent Results (from the past 24 hour(s))   GLUCOSE, POC    Collection Time: 11/25/20  8:24 PM   Result Value Ref Range    Glucose (POC) 327 (H) 65 - 100 mg/dL    Performed by Amey Closs    C REACTIVE PROTEIN, QT    Collection Time: 11/25/20  9:14 PM   Result Value Ref Range    C-Reactive protein 1.40 (H) 0.00 - 0.60 mg/dL   LD    Collection Time: 11/25/20  9:14 PM   Result Value Ref Range     (H) 81 - 246 U/L   GLUCOSE, POC    Collection Time: 11/26/20  8:45 AM   Result Value Ref Range    Glucose (POC) 309 (H) 65 - 100 mg/dL    Performed by Gipisgi    GLUCOSE, POC    Collection Time: 11/26/20 12:30 PM   Result Value Ref Range    Glucose (POC) 550 (H) 65 - 100 mg/dL    Performed by Gipisgi    GLUCOSE, POC    Collection Time: 11/26/20  2:34 PM   Result Value Ref Range    Glucose (POC) 392 (H) 65 - 100 mg/dL    Performed by Jonathan Baker        Results     Procedure Component Value Units Date/Time    CULTURE, BLOOD #2 [[id number]] Collected:  11/22/20 6504    Order Status:  Completed Specimen:  Blood Updated:  11/26/20 1235     Special Requests: No Special Requests        Culture result: No growth 3 days       CULTURE, BLOOD #1 [[id number]] Collected:  11/22/20 1430    Order Status:  Completed Specimen:  Blood Updated:  11/23/20 0820    CULTURE, BLOOD, PAIRED [[id number]] Collected:  11/22/20 0935    Order Status:  Completed Specimen:  Blood Updated:  11/26/20 0821     Special Requests: No Special Requests        Culture result: No growth 4 days              Labs:     Recent Labs     11/24/20 0903   WBC 12.9*   HGB 13.2   HCT 40.2   *     Recent Labs     11/24/20 0903      K 4.7      CO2 29   BUN 18   CREA 0.80   *   CA 9.6     Recent Labs     11/24/20 0903   ALT 51   AP 87   TBILI 0.5   TP 8.2   ALB 3.0*   GLOB 5.2*     No results for input(s): INR, PTP, APTT, INREXT, INREXT in the last 72 hours. Recent Labs     11/24/20 0903   FERR 355*      No results found for: FOL, RBCF   No results for input(s): PH, PCO2, PO2 in the last 72 hours. No results for input(s): CPK, CKNDX, TROIQ in the last 72 hours.     No lab exists for component: CPKMB  No results found for: CHOL, CHOLX, CHLST, CHOLV, HDL, HDLP, LDL, LDLC, DLDLP, TGLX, TRIGL, TRIGP, CHHD, CHHDX  Lab Results   Component Value Date/Time    Glucose (POC) 392 (H) 11/26/2020 02:34 PM    Glucose (POC) 550 (H) 11/26/2020 12:30 PM    Glucose (POC) 309 (H) 11/26/2020 08:45 AM    Glucose (POC) 327 (H) 11/25/2020 08:24 PM    Glucose (POC) 444 (H) 11/25/2020 12:23 PM     No results found for: COLOR, APPRN, SPGRU, REFSG, MITCH, PROTU, GLUCU, KETU, BILU, UROU, GARDENIA, LEUKU, GLUKE, EPSU, BACTU, WBCU, RBCU, CASTS, UCRY      Assessment:   Acute hypoxemic respiratory failure  COVID-19 positive  Acute hypoxemia  Type 2 diabetes  Hypertension  Hypokalemia    Due to hypercoagulable state with COVID-19 starting Eliquis      Plan:     Continue Zosyn and Zithromax and and Decadron  Start IV remdesivir 100 mg daily   order convalescent plasma      Monitor oxygen saturation  Monitor blood sugars        Current Facility-Administered Medications:     diphenhydrAMINE (BENADRYL) capsule 25 mg, 25 mg, Oral, Q6H PRN, Orlin Kelly MD, 25 mg at 11/25/20 1242    ibuprofen (MOTRIN) tablet 600 mg, 600 mg, Oral, Q6H PRN, Orlin Kelly MD, 600 mg at 11/25/20 1000    apixaban (ELIQUIS) tablet 10 mg, 10 mg, Oral, BID, Zhang Smith MD, 10 mg at 11/26/20 0847    0.9% sodium chloride infusion 250 mL, 250 mL, IntraVENous, PRN, Alexa Ballard MD    0.9% sodium chloride infusion, 25 mL/hr, IntraVENous, CONTINUOUS, Zhang Smith MD, Last Rate: 25 mL/hr at 11/24/20 1900, 25 mL/hr at 11/24/20 1900    polyethylene glycol (MIRALAX) packet 17 g, 17 g, Oral, DAILY PRN, Patric Kelly MD    ondansetron (ZOFRAN ODT) tablet 4 mg, 4 mg, Oral, Q8H PRN **OR** ondansetron (ZOFRAN) injection 4 mg, 4 mg, IntraVENous, Q6H PRN, Patric Kelly MD    insulin lispro (HUMALOG) injection, , SubCUTAneous, AC&HS, Orlin Kelly MD, 15 Units at 11/26/20 1303    glucose chewable tablet 16 g, 4 Tab, Oral, PRN, Patric Kelly MD    dextrose (D50W) injection syrg 12.5-25 g, 25-50 mL, IntraVENous, PRN, Patric Kelly MD    glucagon (GLUCAGEN) injection 1 mg, 1 mg, IntraMUSCular, PRN, Patric Kelly MD    dexamethasone (DECADRON) 4 mg/mL injection 4 mg, 4 mg, IntraVENous, Q6H, Orlin Kelly MD, 4 mg at 11/26/20 1240    piperacillin-tazobactam (ZOSYN) 3.375 g in 0.9% sodium chloride (MBP/ADV) 100 mL MBP, 3.375 g, IntraVENous, Q8H, Orlin Kelly MD, Last Rate: 200 mL/hr at 11/26/20 1317, 3.375 g at 11/26/20 1317    azithromycin (ZITHROMAX) tablet 500 mg, 500 mg, Oral, DAILY, Orlin Kelly MD, 500 mg at 11/26/20 0847    remdesivir 100 mg in 0.9% sodium chloride 250 mL IVPB, 100 mg, IntraVENous, Q24H, Alexa Ballard MD, 100 mg at 11/26/20 1503    influenza vaccine 2020-21 (4 yrs+)(PF) (FLUCELVAX QUAD) injection 0.5 mL, 0.5 mL, IntraMUSCular, PRIOR TO DISCHARGE, Patric Kelly MD

## 2020-11-26 NOTE — PROGRESS NOTES
Progress Note    Patient: Thaddeus Day MRN: [id number]  SSN: [id number]    YOB: 1960  Age: [address] y.o. Sex: female      Admit Date: 11/22/2020    LOS: 4 days     Subjective:   Patient followed for Covid-19 infection with pneumonia, supported by ground glass changes on CT Chest.  WBC increased likely due to steroids. CRP and LDH decreasing. She is still subjectively improving. Mild cough. Objective:     Vitals:    11/25/20 2011 11/25/20 2323 11/26/20 0425 11/26/20 0830   BP: (!) 143/82 (!) 143/81 137/75 130/85   Pulse: 70 72 60 82   Resp: 20 19 20 18   Temp: 97.5 °F (36.4 °C) 97.8 °F (36.6 °C) 97.9 °F (36.6 °C) 98 °F (36.7 °C)   SpO2: 96% 93% (!) 89% 91%   Weight:       Height:            Intake and Output:  Current Shift: No intake/output data recorded. Last three shifts: No intake/output data recorded. Physical Exam:    Vitals signs and nursing note reviewed. Constitutional:       General: She is not in acute distress. Appearance: Normal appearance. She is mildly ill-appearing. HENT: unremarkable, nasal O2 2L/min  Neck:      Musculoskeletal: Neck supple. Cardiovascular:      Rate and Rhythm: Normal rate and regular rhythm. Heart sounds: No murmur. Pulmonary: clear bilaterally   Abdominal:      Palpations: Abdomen is soft. Tenderness: There is no abdominal tenderness. There is no guarding. Genitourinary:     Comments: No [place]  Skin:     Findings: No rash. Neurological:      General: No focal deficit present. Mental Status: She is alert and oriented to person, place, and time. Psychiatric:         Mood and Affect: Mood normal.         Behavior: Behavior normal.         Thought Content:  Thought content normal.         Judgment: Judgment normal.     Lab/Data Review:     WBC 12,900   <302 <287 <402  Ferritin 355 <350 <377  CRP 1.40 <4.10 <8.69 <10.50    Blood cultures (11/22) No growth at 4 days  Blood cultures (11/22) No growth at 3 days Assessment:     Active Problems:    COVID-19 (11/22/2020)    1. Covid-19 infection with probable pneumonitis, Day #5 IV Remdesivir, Zithromax, Decadron, s/p convalescent plasma and Actemra x 2.  2. Elevated CRP and LDH, probably secondary to #1  3. Acute hypoxic respiratory failure, on nasal O2     Comment:  LDH and CRP decreasing. Leukocytosis likely steroid related. Plan:   1. Discontinue  Azithromycin, Remdesivir after today  2. Cleared for discharge from ID standpoint after today  3. In am, repeat CRP, LDH, done  4.  Follow-up blood cultures          Signed By: Ellyn Vizcarra MD     November 26, 2020

## 2020-11-27 ENCOUNTER — APPOINTMENT (OUTPATIENT)
Dept: GENERAL RADIOLOGY | Age: 60
DRG: 177 | End: 2020-11-27
Attending: INTERNAL MEDICINE
Payer: COMMERCIAL

## 2020-11-27 LAB
ALBUMIN SERPL-MCNC: 2.7 G/DL (ref 3.5–5)
ANION GAP SERPL CALC-SCNC: 10 MMOL/L (ref 5–15)
BUN SERPL-MCNC: 22 MG/DL (ref 6–20)
BUN/CREAT SERPL: 23 (ref 12–20)
CA-I BLD-MCNC: 9 MG/DL (ref 8.5–10.1)
CHLORIDE SERPL-SCNC: 102 MMOL/L (ref 97–108)
CO2 SERPL-SCNC: 24 MMOL/L (ref 21–32)
CREAT SERPL-MCNC: 0.94 MG/DL (ref 0.55–1.02)
CRP SERPL-MCNC: 0.64 MG/DL (ref 0–0.6)
GLUCOSE BLD STRIP.AUTO-MCNC: 273 MG/DL (ref 65–100)
GLUCOSE BLD STRIP.AUTO-MCNC: 289 MG/DL (ref 65–100)
GLUCOSE BLD STRIP.AUTO-MCNC: 330 MG/DL (ref 65–100)
GLUCOSE BLD STRIP.AUTO-MCNC: 371 MG/DL (ref 65–100)
GLUCOSE SERPL-MCNC: 399 MG/DL (ref 65–100)
LDH SERPL L TO P-CCNC: 263 U/L (ref 81–246)
PERFORMED BY, TECHID: ABNORMAL
PHOSPHATE SERPL-MCNC: 3.3 MG/DL (ref 2.6–4.7)
POTASSIUM SERPL-SCNC: 3.6 MMOL/L (ref 3.5–5.1)
SODIUM SERPL-SCNC: 136 MMOL/L (ref 136–145)

## 2020-11-27 PROCEDURE — 82962 GLUCOSE BLOOD TEST: CPT

## 2020-11-27 PROCEDURE — 74011250636 HC RX REV CODE- 250/636: Performed by: FAMILY MEDICINE

## 2020-11-27 PROCEDURE — 65270000029 HC RM PRIVATE

## 2020-11-27 PROCEDURE — 74011250636 HC RX REV CODE- 250/636: Performed by: INTERNAL MEDICINE

## 2020-11-27 PROCEDURE — 71045 X-RAY EXAM CHEST 1 VIEW: CPT

## 2020-11-27 PROCEDURE — 74011636637 HC RX REV CODE- 636/637: Performed by: FAMILY MEDICINE

## 2020-11-27 PROCEDURE — 99231 SBSQ HOSP IP/OBS SF/LOW 25: CPT | Performed by: INTERNAL MEDICINE

## 2020-11-27 PROCEDURE — 86140 C-REACTIVE PROTEIN: CPT

## 2020-11-27 PROCEDURE — 74011000258 HC RX REV CODE- 258: Performed by: FAMILY MEDICINE

## 2020-11-27 PROCEDURE — 74011250637 HC RX REV CODE- 250/637: Performed by: FAMILY MEDICINE

## 2020-11-27 PROCEDURE — 83615 LACTATE (LD) (LDH) ENZYME: CPT

## 2020-11-27 PROCEDURE — 36415 COLL VENOUS BLD VENIPUNCTURE: CPT

## 2020-11-27 PROCEDURE — 74011250637 HC RX REV CODE- 250/637: Performed by: INTERNAL MEDICINE

## 2020-11-27 PROCEDURE — 80069 RENAL FUNCTION PANEL: CPT

## 2020-11-27 RX ADMIN — DEXAMETHASONE SODIUM PHOSPHATE 4 MG: 4 INJECTION, SOLUTION INTRAMUSCULAR; INTRAVENOUS at 06:09

## 2020-11-27 RX ADMIN — DEXAMETHASONE SODIUM PHOSPHATE 4 MG: 4 INJECTION, SOLUTION INTRAMUSCULAR; INTRAVENOUS at 12:37

## 2020-11-27 RX ADMIN — PIPERACILLIN AND TAZOBACTAM 3.38 G: 3; .375 INJECTION, POWDER, LYOPHILIZED, FOR SOLUTION INTRAVENOUS at 05:05

## 2020-11-27 RX ADMIN — SODIUM CHLORIDE 25 ML/HR: 9 INJECTION, SOLUTION INTRAVENOUS at 05:05

## 2020-11-27 RX ADMIN — APIXABAN 10 MG: 5 TABLET, FILM COATED ORAL at 08:33

## 2020-11-27 RX ADMIN — INSULIN LISPRO 15 UNITS: 100 INJECTION, SOLUTION INTRAVENOUS; SUBCUTANEOUS at 12:43

## 2020-11-27 RX ADMIN — INSULIN LISPRO 4 UNITS: 100 INJECTION, SOLUTION INTRAVENOUS; SUBCUTANEOUS at 21:45

## 2020-11-27 RX ADMIN — AZITHROMYCIN MONOHYDRATE 500 MG: 500 TABLET ORAL at 08:33

## 2020-11-27 RX ADMIN — INSULIN LISPRO 7 UNITS: 100 INJECTION, SOLUTION INTRAVENOUS; SUBCUTANEOUS at 16:44

## 2020-11-27 RX ADMIN — INSULIN LISPRO 10 UNITS: 100 INJECTION, SOLUTION INTRAVENOUS; SUBCUTANEOUS at 07:30

## 2020-11-27 RX ADMIN — APIXABAN 10 MG: 5 TABLET, FILM COATED ORAL at 21:44

## 2020-11-27 NOTE — PROGRESS NOTES
Progress Note    Patient: Tarsha Huizar MRN: [id number]  SSN: [id number]    YOB: 1960  Age: 61 y.o. Sex: female      Admit Date: 11/22/2020    LOS: 5 days     Subjective:   Patient followed for Covid-19 infection with pneumonia, supported by ground glass changes on CT Chest.  CXR today unchanged. WBC increased likely due to steroids. CRP and LDH decreasing. She is still subjectively improving. No complaints. Ambulatory with no SPARROW. Objective:     Vitals:    11/26/20 2030 11/26/20 2357 11/27/20 0405 11/27/20 0829   BP: (!) 141/81 129/81 (!) 140/76 120/81   Pulse: 92 72 61 89   Resp: 20 17 20 18   Temp: 98.1 °F (36.7 °C) 97.9 °F (36.6 °C) 97.8 °F (36.6 °C) 97.8 °F (36.6 °C)   SpO2: 93% 93% 93% 93%   Weight:       Height:            Intake and Output:  Current Shift: No intake/output data recorded. Last three shifts: No intake/output data recorded. Physical Exam:    Vitals signs and nursing note reviewed. Constitutional:       General: She is not in acute distress. Appearance: Normal appearance. She is non ill appearing   HENT: unremarkable, OFF nasal O2   Neck:      Musculoskeletal: Neck supple. Cardiovascular:      Rate and Rhythm: Normal rate and regular rhythm. Heart sounds: No murmur. Pulmonary: clear bilaterally   Abdominal:      Palpations: Abdomen is soft. Tenderness: There is no abdominal tenderness. There is no guarding. Genitourinary:     Comments: No [place]  Skin:     Findings: No rash. Neurological:      General: No focal deficit present. Mental Status: She is alert and oriented to person, place, and time. Psychiatric:         Mood and Affect: Mood normal.         Behavior: Behavior normal.         Thought Content:  Thought content normal.         Judgment: Judgment normal.     Lab/Data Review:     WBC 12,900   <260 <302 <287 <402  Ferritin 355 <350 <377  CRP 0.64  <1.40 <4.10 <8.69 <10.50    Blood cultures (11/22) No growth at 4 days  Blood cultures (11/22) No growth at 3 days     CXR (11/27) Unchanged mild atelectasis in the lower lungs with continued hypoinflation. No effusion or pneumothorax. Normal heart and mediastinum  Assessment:     Active Problems:    COVID-19 (11/22/2020)    1. Covid-19 infection with probable pneumonitis, Day #5 IV Remdesivir, Zithromax, Decadron, s/p convalescent plasma and Actemra x 2.  2. Elevated CRP and LDH, probably secondary to #1, resolving  3. Acute hypoxic respiratory failure, on nasal O2     Comment:  LDH and CRP decreasing. Leukocytosis likely steroid related. Plan:   1. Discontinue  Azithromycin, Remdesivir   2. Cleared for discharge from ID standpoint after today  3.  Follow-up blood cultures (low index of suspicion for bacteremia)          Signed By: Mamadou Lam MD     November 27, 2020

## 2020-11-27 NOTE — ROUTINE PROCESS
Bedside and Verbal shift change report given to Avinash Romero RN  (oncoming nurse) by Adin Lawrence. Bolivar Pugh RN (offgoing nurse). Report included the following information SBAR, MAR and Recent Results.

## 2020-11-27 NOTE — PROGRESS NOTES
Problem: Risk for Spread of Infection  Goal: Prevent transmission of infectious organism to others  Description: Prevent the transmission of infectious organisms to other patients, staff members, and visitors. Outcome: Progressing Towards Goal     Problem: Falls - Risk of  Goal: *Absence of Falls  Description: Document Sidney Arellano Fall Risk and appropriate interventions in the flowsheet.   Outcome: Progressing Towards Goal  Note: Fall Risk Interventions:            Medication Interventions: Teach patient to arise slowly, Patient to call before getting OOB

## 2020-11-27 NOTE — PROGRESS NOTES
Consult  Pulmonary, Critical Care    Name: Emmy Fisher MRN: [id number]   : 1960 Hospital: [address] [place] [address]   Date: 2020  Admission date: 2020 Hospital Day: 6       Subjective/Interval History:   Patient seen on medical floor. She is currently wearing oxygen. States she tested positive for COVID-19 a little under a week ago. Had been feeling fine but yesterday developed nonproductive cough and shortness of breath particularly with exertion. Felt slightly feverish but has not had any alteration in her sense of smell or taste no appetite suppression.  wearing nasal oxygen states she feels fairly well minimal cough   feels fairly well states her appetite is better has had no cough today   feels fine today no specific complaints states she sat up in the chair yesterday without difficulty. Chest x-ray today minimal improvement overnight oximetry shows minimal oxygen desaturation when off oxygen   remains on room air oxygen saturation well-maintained no specific complaints    Hospital Problems  Never Reviewed          Codes Class Noted POA    COVID-19 ICD-10-CM: U07.1  ICD-9-CM: 079.89  2020 Unknown              IMPRESSION:   1. COVID-19 infection  2. COVID-19 pneumonitis Marked groundglass infiltrates on CT chest x-ray seems mildly improved  3. Acute hypoxic respiratory failure currently on room air  4. Hypokalemia repleted      RECOMMENDATIONS/PLAN:   1. Completed 5 days of remdesivir  2 doses Actemra and continues on Decadron. We will discontinue Decadron today  2. Repeat chest x-ray and CRP in a.m. 3. Repeat overnight oximetry  4. If no deterioration would seems stable for discharge in a.m. [x] High complexity decision making was performed  [x] See my orders for details      Subjective/Initial History:     I was asked by Lázaro Marcus MD to see Emmy Fisher  a 61 y.o.    female in consultation for a chief complaint of COVID-19 infection with hypoxia and abnormal chest x-ray    Allergies   Allergen Reactions    Ampicillin Itching    Percocet [Oxycodone-Acetaminophen] Itching        MAR reviewed and pertinent medications noted or modified as needed     Current Facility-Administered Medications   Medication    diphenhydrAMINE (BENADRYL) capsule 25 mg    ibuprofen (MOTRIN) tablet 600 mg    apixaban (ELIQUIS) tablet 10 mg    0.9% sodium chloride infusion 250 mL    0.9% sodium chloride infusion    polyethylene glycol (MIRALAX) packet 17 g    ondansetron (ZOFRAN ODT) tablet 4 mg    Or    ondansetron (ZOFRAN) injection 4 mg    insulin lispro (HUMALOG) injection    glucose chewable tablet 16 g    dextrose (D50W) injection syrg 12.5-25 g    glucagon (GLUCAGEN) injection 1 mg    influenza vaccine 2020-21 (4 yrs+)(PF) (FLUCELVAX QUAD) injection 0.5 mL      Patient PCP: Richard Reed PA-C  PMH:  has a past medical history of Diabetes ([address] GERD (gastroesophageal reflux disease), and Hypertension. PSH:   has a past surgical history that includes hx other surgical.   FHX: family history is not on file. SHX:  reports that she has never smoked. She has never used smokeless tobacco. She reports that she does not drink alcohol or use drugs. Systemic review:  General states her weight is stable has not had any change in her appetite.   May have had a little bit of fever at home  Eyes no double vision momentary blindness  ENT no facial pain over the sinuses or drainage  Musculoskeletal no swollen tender joints no myalgias  Endocrinologic no polyuria polydipsia  Neurologic no seizures or syncope  Gastrointestinal soft has a history of reflux states as long as she takes her medications she is asymptomatic no sour bitter taste at night no nausea vomiting as mentioned her sense of taste is normal  Genitourinary no pain or discomfort on urination no bleeding or drainage  Cardiovascular no ankle edema chest pain or diaphoresis  Respiratory new onset cough yesterday nonproductive no definite fever although she may have felt hot    Objective:     Vital Signs: Telemetry:    normal sinus rhythm Intake/Output:   Visit Vitals  /79 (BP 1 Location: Left arm, BP Patient Position: At rest)   Pulse 95   Temp 97.8 °F (36.6 °C)   Resp 18   Ht 5' 6\" (1.676 m)   Wt 84.8 kg (186 lb 15.2 oz)   SpO2 96%   Breastfeeding No   BMI 30.17 kg/m²       Temp (24hrs), Av °F (36.7 °C), Min:97.8 °F (36.6 °C), Max:98.6 °F (37 °C)        O2 Device: Room air O2 Flow Rate (L/min): 2 l/min       Wt Readings from Last 4 Encounters:   20 84.8 kg (186 lb 15.2 oz)        No intake or output data in the 24 hours ending 20 1515    Last shift:      No intake/output data recorded. Last 3 shifts: No intake/output data recorded. Physical Exam:   General:  female; in no apparent distress; on nasal oxygen 2 L  HEENT: NCAT, normal oral mucosa  Eyes: anicteric; conjunctiva clear extraocular movements intact  Neck: no nodes, no JVD no accessory muscle usage  Chest: no deformity,   Cardiac: Regular rate and rhythm no definite murmur no edema  Lungs: Clear anteriorly and laterally right posterior has rales extending assisted up left side has minimal rales in the left base  Abd: Soft nontender normal bowel sounds no organomegaly  Ext: no edema; no joint swelling; No clubbing  :clear urine  Neuro: Awake alert oriented speech is clear moves all 4 extremities normally  Psych- no agitation, oriented to person;  Skin: warm, dry, no cyanosis;   Pulses: Brachial radial femoral pulses intact  Capillary: Normal capillary refill    Labs:    No results for input(s): WBC, HGB, PLT, INR, APTT, HGBEXT, PLTEXT, INREXT, HGBEXT, PLTEXT, INREXT in the last 72 hours.     No lab exists for component: FIB, DDMER  Recent Labs     20  1203      K 3.6      CO2 24   *   BUN 22*   CREA 0.94   CA 9.0   PHOS 3.3   ALB 2.7*     No results for input(s): PH, PCO2, PO2, HCO3, FIO2 in the last 72 hours. Currently on 2 L nasal oxygen oxygen saturation 96%  11/25 overnight oximetry with low oxygen saturation 84% with 9 minutes below 88%  CRP 10.5  Procalcitonin less than 0.05    BNP 15  Imaging:    CXR Results  (Last 48 hours)  11/23 CT chest shows extensive diffuse groundglass infiltrates               11/22/20 0852  XR CHEST SNGL V Final result    Impression:  Impression:    1. Mild pulmonary vascular congestion. 2. Mild bibasilar airspace disease, atelectasis versus developing pneumonia. Narrative:  AP portable chest, 0847 hours. Comparison: 11/12/2015. Findings: The cardiomediastinal silhouette is within normal limits. There is   mild pulmonary vascular congestion. Mild bibasilar airspace disease is noted. There is no pleural effusion or pneumothorax. There are senescent changes within   the spine. Results from [place] encounter on 11/22/20   XR CHEST PORT    Narrative 1 view comparison 25th    Unchanged mild atelectasis in the lower lungs with continued hypoinflation. No  effusion or pneumothorax. Normal heart and mediastinum   XR CHEST PORT    Narrative Chest single view. Comparison single view chest November 22, 2020. Hazy reticular markings throughout the lungs; these are most dense left  mid/lower lung. Perhaps slight improvement in aeration compared to prior  imaging. Cardiac and mediastinal structures unchanged. No pneumothorax or  pleural effusion. XR CHEST SNGL V    Narrative AP portable chest, 0847 hours. Comparison: 11/12/2015. Findings: The cardiomediastinal silhouette is within normal limits. There is  mild pulmonary vascular congestion. Mild bibasilar airspace disease is noted. There is no pleural effusion or pneumothorax. There are senescent changes within  the spine. Impression Impression:   1. Mild pulmonary vascular congestion.   2. Mild bibasilar airspace disease, atelectasis versus developing pneumonia. · Discussion COVID-19 test + 5 to 7 days ago  now with  cough and shortness of breath. Chest x-ray has vague changes of bilateral patchy infiltrate. CT shows extensive groundglass infiltrates throughout the lungs.   Continue Remdesivir Decadron will add Actemra and fully anticoagulate with Eliquedgar Jackson MD

## 2020-11-27 NOTE — PROGRESS NOTES
General Daily Progress Note          Patient Name:   Gomez Cesar       YOB: 1960       Age:  61 y.o. Admit Date: 11/22/2020      Subjective:       Patient still complaining of shortness of breath on 2 L oxygen by nasal cannula  Saturation 92%        Objective:     Visit Vitals  /81 (BP 1 Location: Left arm, BP Patient Position: At rest)   Pulse 89   Temp 97.8 °F (36.6 °C)   Resp 18   Ht 5' 6\" (1.676 m)   Wt 84.8 kg (186 lb 15.2 oz)   SpO2 93%   Breastfeeding No   BMI 30.17 kg/m²        Recent Results (from the past 24 hour(s))   GLUCOSE, POC    Collection Time: 11/26/20 12:30 PM   Result Value Ref Range    Glucose (POC) 550 (H) 65 - 100 mg/dL    Performed by Saloni Cai    GLUCOSE, POC    Collection Time: 11/26/20  2:34 PM   Result Value Ref Range    Glucose (POC) 392 (H) 65 - 100 mg/dL    Performed by Omar Barone    GLUCOSE, POC    Collection Time: 11/26/20  5:45 PM   Result Value Ref Range    Glucose (POC) 194 (H) 65 - 100 mg/dL    Performed by Omar Barone    GLUCOSE, POC    Collection Time: 11/26/20  8:46 PM   Result Value Ref Range    Glucose (POC) 328 (H) 65 - 100 mg/dL    Performed by Tevin Ritter    GLUCOSE, POC    Collection Time: 11/27/20  9:05 AM   Result Value Ref Range    Glucose (POC) 330 (H) 65 - 100 mg/dL    Performed by Helena Cano      [unfilled]      Review of Systems    Constitutional: Negative for chills and fever. HENT: Negative. Eyes: Negative. Respiratory: Negative. Cardiovascular: Negative. Gastrointestinal: Negative for abdominal pain and nausea. Skin: Negative. Neurological: Negative. Physical Exam:      Constitutional: pt is oriented to person, place, and time. HENT:   Head: Normocephalic and atraumatic. Eyes: Pupils are equal, round, and reactive to light. EOM are normal.   Cardiovascular: Normal rate, regular rhythm and normal heart sounds. Pulmonary/Chest: Breath sounds normal. No wheezes. No rales.    Exhibits no tenderness. Abdominal: Soft. Bowel sounds are normal. There is no abdominal tenderness. There is no rebound and no guarding. Musculoskeletal: Normal range of motion. Neurological: pt is alert and oriented to person, place, and time. XR CHEST PORT   Final Result      XR CHEST PORT   Final Result      CT CHEST WO CONT   Final Result   Impression: Groundglass opacities in all lobes concerning for infection or   inflammatory process. Covid-19 pneumonia should be considered in the   appropriate clinical setting. Other findings as above. XR CHEST SNGL V   Final Result   Impression:    1. Mild pulmonary vascular congestion. 2. Mild bibasilar airspace disease, atelectasis versus developing pneumonia.            Recent Results (from the past 24 hour(s))   GLUCOSE, POC    Collection Time: 11/26/20 12:30 PM   Result Value Ref Range    Glucose (POC) 550 (H) 65 - 100 mg/dL    Performed by Bekah Watts    GLUCOSE, POC    Collection Time: 11/26/20  2:34 PM   Result Value Ref Range    Glucose (POC) 392 (H) 65 - 100 mg/dL    Performed by Rewardli Savers    GLUCOSE, POC    Collection Time: 11/26/20  5:45 PM   Result Value Ref Range    Glucose (POC) 194 (H) 65 - 100 mg/dL    Performed by Rewardli Savers    GLUCOSE, POC    Collection Time: 11/26/20  8:46 PM   Result Value Ref Range    Glucose (POC) 328 (H) 65 - 100 mg/dL    Performed by Gary Doyle    GLUCOSE, POC    Collection Time: 11/27/20  9:05 AM   Result Value Ref Range    Glucose (POC) 330 (H) 65 - 100 mg/dL    Performed by [place] [facility]        Results     Procedure Component Value Units Date/Time    CULTURE, BLOOD #2 [[id number]] Collected:  11/22/20 1715    Order Status:  Completed Specimen:  Blood Updated:  11/26/20 0821     Special Requests: No Special Requests        Culture result: No growth 3 days       CULTURE, BLOOD #1 [[id number]] Collected:  11/22/20 1430    Order Status:  Completed Specimen:  Blood Updated:  11/23/20 0820    CULTURE, BLOOD, PAIRED [[id number]] Collected:  11/22/20 0935    Order Status:  Completed Specimen:  Blood Updated:  11/26/20 1876     Special Requests: No Special Requests        Culture result: No growth 4 days              Labs:     No results for input(s): WBC, HGB, HCT, PLT, HGBEXT, HCTEXT, PLTEXT, HGBEXT, HCTEXT, PLTEXT in the last 72 hours. No results for input(s): NA, K, CL, CO2, BUN, CREA, GLU, CA, MG, PHOS, URICA in the last 72 hours. No results for input(s): ALT, AP, TBIL, TBILI, TP, ALB, GLOB, GGT, AML, LPSE in the last 72 hours. No lab exists for component: SGOT, GPT, AMYP, HLPSE  No results for input(s): INR, PTP, APTT, INREXT, INREXT in the last 72 hours. No results for input(s): FE, TIBC, PSAT, FERR in the last 72 hours. No results found for: FOL, RBCF   No results for input(s): PH, PCO2, PO2 in the last 72 hours. No results for input(s): CPK, CKNDX, TROIQ in the last 72 hours.     No lab exists for component: CPKMB  No results found for: CHOL, CHOLX, CHLST, CHOLV, HDL, HDLP, LDL, LDLC, DLDLP, TGLX, TRIGL, TRIGP, CHHD, CHHDX  Lab Results   Component Value Date/Time    Glucose (POC) 330 (H) 11/27/2020 09:05 AM    Glucose (POC) 328 (H) 11/26/2020 08:46 PM    Glucose (POC) 194 (H) 11/26/2020 05:45 PM    Glucose (POC) 392 (H) 11/26/2020 02:34 PM    Glucose (POC) 550 (H) 11/26/2020 12:30 PM     No results found for: COLOR, APPRN, SPGRU, REFSG, MITCH, PROTU, GLUCU, KETU, BILU, UROU, [place], LEUKU, GLUKE, EPSU, BACTU, WBCU, RBCU, CASTS, UCRY      Assessment:   Acute hypoxemic respiratory failure  COVID-19 positive  Acute hypoxemia  Type 2 diabetes  Hypertension  Hypokalemia    Due to hypercoagulable state with COVID-19 starting Eliquis      Plan:     Continue Zosyn and Zithromax and and Decadron  Start IV remdesivir 100 mg daily   order convalescent plasma      Monitor oxygen saturation  Monitor blood sugars  PT OT consult discharge home when cleared by the pulmonologist        Current Facility-Administered Medications:     diphenhydrAMINE (BENADRYL) capsule 25 mg, 25 mg, Oral, Q6H PRN, Orlin Kelly MD, 25 mg at 11/26/20 2042    ibuprofen (MOTRIN) tablet 600 mg, 600 mg, Oral, Q6H PRN, Orlin Kelly MD, 600 mg at 11/25/20 1000    apixaban (ELIQUIS) tablet 10 mg, 10 mg, Oral, BID, Tomas Mason MD, 10 mg at 11/27/20 4032    0.9% sodium chloride infusion 250 mL, 250 mL, IntraVENous, PRN, Shelby Goff MD    0.9% sodium chloride infusion, 25 mL/hr, IntraVENous, CONTINUOUS, Tomas Mason MD, Last Rate: 25 mL/hr at 11/27/20 0505, 25 mL/hr at 11/27/20 0505    polyethylene glycol (MIRALAX) packet 17 g, 17 g, Oral, DAILY PRN, Brooklyn Kelly MD    ondansetron (ZOFRAN ODT) tablet 4 mg, 4 mg, Oral, Q8H PRN **OR** ondansetron (ZOFRAN) injection 4 mg, 4 mg, IntraVENous, Q6H PRN, Orlin Kelly MD    insulin lispro (HUMALOG) injection, , SubCUTAneous, AC&HS, Orlin Kelly MD, 10 Units at 11/27/20 0730    glucose chewable tablet 16 g, 4 Tab, Oral, PRN, Orlin Kelly MD    dextrose (D50W) injection syrg 12.5-25 g, 25-50 mL, IntraVENous, PRN, Brooklyn Kelly MD    glucagon (GLUCAGEN) injection 1 mg, 1 mg, IntraMUSCular, PRN, Orlin Kelly MD    dexamethasone (DECADRON) 4 mg/mL injection 4 mg, 4 mg, IntraVENous, Q6H, Orlin Kelly MD, 4 mg at 11/27/20 0609    influenza vaccine 2020-21 (4 yrs+)(PF) (FLUCELVAX QUAD) injection 0.5 mL, 0.5 mL, IntraMUSCular, PRIOR TO DISCHARGE, Brooklyn Kelly MD

## 2020-11-28 VITALS
SYSTOLIC BLOOD PRESSURE: 131 MMHG | OXYGEN SATURATION: 95 % | RESPIRATION RATE: 20 BRPM | WEIGHT: 186.95 LBS | DIASTOLIC BLOOD PRESSURE: 78 MMHG | TEMPERATURE: 97.8 F | HEART RATE: 66 BPM | BODY MASS INDEX: 30.05 KG/M2 | HEIGHT: 66 IN

## 2020-11-28 LAB
BACTERIA SPEC CULT: NORMAL
CRP SERPL-MCNC: 0.38 MG/DL (ref 0–0.6)
GLUCOSE BLD STRIP.AUTO-MCNC: 213 MG/DL (ref 65–100)
GLUCOSE BLD STRIP.AUTO-MCNC: 316 MG/DL (ref 65–100)
PERFORMED BY, TECHID: ABNORMAL
PERFORMED BY, TECHID: ABNORMAL
SPECIAL REQUESTS,SREQ: NORMAL

## 2020-11-28 PROCEDURE — 82962 GLUCOSE BLOOD TEST: CPT

## 2020-11-28 PROCEDURE — 74011250637 HC RX REV CODE- 250/637: Performed by: INTERNAL MEDICINE

## 2020-11-28 PROCEDURE — 74011636637 HC RX REV CODE- 636/637: Performed by: FAMILY MEDICINE

## 2020-11-28 PROCEDURE — 36415 COLL VENOUS BLD VENIPUNCTURE: CPT

## 2020-11-28 PROCEDURE — 86140 C-REACTIVE PROTEIN: CPT

## 2020-11-28 PROCEDURE — 94762 N-INVAS EAR/PLS OXIMTRY CONT: CPT

## 2020-11-28 RX ADMIN — INSULIN LISPRO 10 UNITS: 100 INJECTION, SOLUTION INTRAVENOUS; SUBCUTANEOUS at 11:30

## 2020-11-28 RX ADMIN — APIXABAN 10 MG: 5 TABLET, FILM COATED ORAL at 10:09

## 2020-11-28 RX ADMIN — INSULIN LISPRO 4 UNITS: 100 INJECTION, SOLUTION INTRAVENOUS; SUBCUTANEOUS at 10:09

## 2020-11-28 NOTE — DISCHARGE SUMMARY
Admit date: 11/22/2020   Admitting Provider: Sloan Barber MD    Discharge date: 11/28/2020  Discharging Provider: Polo Duggan MD      * Admission Diagnoses: [id number] [U07.1]    * Discharge Diagnoses:    Hospital Problems as of 11/28/2020 Never Reviewed          Codes Class Noted - Resolved POA    COVID-19 ICD-10-CM: U07.1  ICD-9-CM: 079.89  11/22/2020 - Present Unknown          Pneumonitis  Glycemia    * Hospital Course: Patient had COVID-19 positive was admitted for shortness of breath was seen by pulmonologist.  Patient received IV remdesivir and IV Toclizumab she is on room air and pulmonary cleared her for discharge    * Procedures:   * No surgery found *      Consults: Pulmonary/Intensive care    Significant Diagnostic Studies: labs:   Recent Results (from the past 24 hour(s))   GLUCOSE, POC    Collection Time: 11/27/20  4:10 PM   Result Value Ref Range    Glucose (POC) 273 (H) 65 - 100 mg/dL    Performed by Omar Barone    GLUCOSE, POC    Collection Time: 11/27/20  9:29 PM   Result Value Ref Range    Glucose (POC) 289 (H) 65 - 100 mg/dL    Performed by Bonita Rayo    C REACTIVE PROTEIN, QT    Collection Time: 11/28/20  8:03 AM   Result Value Ref Range    C-Reactive protein 0.38 0.00 - 0.60 mg/dL   GLUCOSE, POC    Collection Time: 11/28/20  8:11 AM   Result Value Ref Range    Glucose (POC) 213 (H) 65 - 100 mg/dL    Performed by Deena Yousif    GLUCOSE, POC    Collection Time: 11/28/20 12:22 PM   Result Value Ref Range    Glucose (POC) 316 (H) 65 - 100 mg/dL    Performed by Deena Yousif          Discharge Exam:  Visit Vitals  /78   Pulse 66   Temp 97.8 °F (36.6 °C)   Resp 20   Ht 5' 6\" (1.676 m)   Wt 84.8 kg (186 lb 15.2 oz)   SpO2 95%   Breastfeeding No   BMI 30.17 kg/m²     General:  Alert, cooperative, no distress, appears stated age. Head:  Normocephalic, without obvious abnormality, atraumatic. Eyes:  Conjunctivae/corneas clear. PERRL, EOMs intact. Fundi benign.    Ears:  Normal TMs and external ear canals both ears. Nose: Nares normal. Septum midline. Mucosa normal. No drainage or sinus tenderness. Throat: Lips, mucosa, and tongue normal. Teeth and gums normal.   Neck: Supple, symmetrical, trachea midline, no adenopathy, thyroid: no enlargement/tenderness/nodules, no carotid bruit and no JVD. Back:   Symmetric, no curvature. ROM normal. No CVA tenderness. Lungs:   Clear to auscultation bilaterally. Chest wall:  No tenderness or deformity. Heart:  Regular rate and rhythm, S1, S2 normal, no murmur, click, rub or gallop. Breast Exam:  No tenderness, masses, or nipple abnormality. Abdomen:   Soft, non-tender. Bowel sounds normal. No masses,  No organomegaly. Genitalia:  Normal female without lesion, discharge or tenderness. Rectal:  Normal tone,  no masses or tenderness  Guaiac negative stool. Extremities: Extremities normal, atraumatic, no cyanosis or edema. Pulses: 2+ and symmetric all extremities. Skin: Skin color, texture, turgor normal. No rashes or lesions. Lymph nodes: Cervical, supraclavicular, and axillary nodes normal.   Neurologic: CNII-XII intact. Normal strength, sensation and reflexes throughout. * Discharge Condition: improved  * Disposition: Home    Discharge Medications: There are no discharge medications for this patient. * Follow-up Care/Patient Instructions:   Activity: Activity as tolerated  Diet: Cardiac Diet  Wound Care: None needed    Follow-up Information     Follow up With Specialties Details Why Contact Info    Celia Campbell PA-C Physician Assistant In 3 days  [place] [address]  [phone]      Celia Campbell PA-C Physician Assistant   [place] [address]  [phone]      Marlee Washington MD Pulmonary Disease In 1 week  2020 [address] [place] [address] 21           39 minutes discharge time  Signed:  Mónica Knight MD  11/28/2020  4:00 PM

## 2020-11-28 NOTE — PROGRESS NOTES
Consult  Pulmonary, Critical Care    Name: Bernice Maldonado MRN: [id number]   : 1960 Hospital: [address] [place] [address]   Date: 2020  Admission date: 2020 Hospital Day: 7       Subjective/Interval History:   Patient seen on medical floor. She is currently wearing oxygen. States she tested positive for COVID-19 a little under a week ago. Had been feeling fine but yesterday developed nonproductive cough and shortness of breath particularly with exertion. Felt slightly feverish but has not had any alteration in her sense of smell or taste no appetite suppression.  wearing nasal oxygen states she feels fairly well minimal cough   feels fairly well states her appetite is better has had no cough today   feels fine today no specific complaints states she sat up in the chair yesterday without difficulty. Chest x-ray today minimal improvement overnight oximetry shows minimal oxygen desaturation when off oxygen   remains on room air oxygen saturation well-maintained no specific complaints  nonlabored states she feels fine. Overnight oximetry showed no significant desaturation on room air    Hospital Problems  Never Reviewed          Codes Class Noted POA    COVID-19 ICD-10-CM: U07.1  ICD-9-CM: 079.89  2020 Unknown              IMPRESSION:   1. COVID-19 infection  2. COVID-19 pneumonitis Marked groundglass infiltrates on CT chest x-ray  improved  3. Acute hypoxic respiratory failure resolved currently on room air  4. Hypokalemia repleted      RECOMMENDATIONS/PLAN:   1. Completed 5 days of remdesivir  2 doses Actemra and the drawn discontinued   2. Repeat chest x-ray read is unchanged to me its improved and CRP has further improvement overnight oximetry shows no desaturation okay from my standpoint for discharge home  3.             [x] High complexity decision making was performed  [x] See my orders for details      Subjective/Initial History:     I was asked by Ghassan Ames MD to see Gagan Arthur  a 61 y.o.  female in consultation for a chief complaint of COVID-19 infection with hypoxia and abnormal chest x-ray    Allergies   Allergen Reactions    Ampicillin Itching    Percocet [Oxycodone-Acetaminophen] Itching        MAR reviewed and pertinent medications noted or modified as needed     Current Facility-Administered Medications   Medication    diphenhydrAMINE (BENADRYL) capsule 25 mg    ibuprofen (MOTRIN) tablet 600 mg    apixaban (ELIQUIS) tablet 10 mg    0.9% sodium chloride infusion 250 mL    0.9% sodium chloride infusion    polyethylene glycol (MIRALAX) packet 17 g    ondansetron (ZOFRAN ODT) tablet 4 mg    Or    ondansetron (ZOFRAN) injection 4 mg    insulin lispro (HUMALOG) injection    glucose chewable tablet 16 g    dextrose (D50W) injection syrg 12.5-25 g    glucagon (GLUCAGEN) injection 1 mg    influenza vaccine 2020-21 (4 yrs+)(PF) (FLUCELVAX QUAD) injection 0.5 mL      Patient PCP: Traci Billingsley PA-C  PMH:  has a past medical history of Diabetes ([address] GERD (gastroesophageal reflux disease), and Hypertension. PSH:   has a past surgical history that includes hx other surgical.   FHX: family history is not on file. SHX:  reports that she has never smoked. She has never used smokeless tobacco. She reports that she does not drink alcohol or use drugs. Systemic review:  General states her weight is stable has not had any change in her appetite.   May have had a little bit of fever at home  Eyes no double vision momentary blindness  ENT no facial pain over the sinuses or drainage  Musculoskeletal no swollen tender joints no myalgias  Endocrinologic no polyuria polydipsia  Neurologic no seizures or syncope  Gastrointestinal soft has a history of reflux states as long as she takes her medications she is asymptomatic no sour bitter taste at night no nausea vomiting as mentioned her sense of taste is normal  Genitourinary no pain or discomfort on urination no bleeding or drainage  Cardiovascular no ankle edema chest pain or diaphoresis  Respiratory new onset cough yesterday nonproductive no definite fever although she may have felt hot    Objective:     Vital Signs: Telemetry:    normal sinus rhythm Intake/Output:   Visit Vitals  /78   Pulse 66   Temp 97.8 °F (36.6 °C)   Resp 20   Ht 5' 6\" (1.676 m)   Wt 84.8 kg (186 lb 15.2 oz)   SpO2 95%   Breastfeeding No   BMI 30.17 kg/m²       Temp (24hrs), Av.9 °F (36.6 °C), Min:97.7 °F (36.5 °C), Max:98.2 °F (36.8 °C)        O2 Device: Room air O2 Flow Rate (L/min): 2 l/min       Wt Readings from Last 4 Encounters:   20 84.8 kg (186 lb 15.2 oz)        No intake or output data in the 24 hours ending 20 1357    Last shift:      No intake/output data recorded. Last 3 shifts: No intake/output data recorded. Physical Exam:   General:  female; in no apparent distress; on nasal oxygen 2 L  HEENT: NCAT, normal oral mucosa  Eyes: anicteric; conjunctiva clear extraocular movements intact  Neck: no nodes, no JVD no accessory muscle usage  Chest: no deformity,   Cardiac: Regular rate and rhythm no definite murmur no edema  Lungs: Clear anteriorly and laterally right posterior has rales extending FCI up left side has minimal rales in the left base  Abd: Soft nontender normal bowel sounds no organomegaly  Ext: no edema; no joint swelling; No clubbing  :clear urine  Neuro: Awake alert oriented speech is clear moves all 4 extremities normally  Psych- no agitation, oriented to person;  Skin: warm, dry, no cyanosis;   Pulses: Brachial radial femoral pulses intact  Capillary: Normal capillary refill    Labs:    No results for input(s): WBC, HGB, PLT, INR, APTT, HGBEXT, PLTEXT, INREXT, HGBEXT, PLTEXT, INREXT in the last 72 hours.     No lab exists for component: FIB, DDMER  Recent Labs     20  1203      K 3.6      CO2 24 *   BUN 22*   CREA 0.94   CA 9.0   PHOS 3.3   ALB 2.7*     11/28 overnight oximetry with low oxygen saturation 78% with only 20 seconds below 88%  CRP 0.38, previous 0.64, previous 1.40 ,10.5  Procalcitonin less than 0.05    BNP 15  Imaging:    CXR Results  (Last 48 hours)  11/23 CT chest shows extensive diffuse groundglass infiltrates               11/22/20 0852  XR CHEST SNGL V Final result    Impression:  Impression:    1. Mild pulmonary vascular congestion. 2. Mild bibasilar airspace disease, atelectasis versus developing pneumonia. Narrative:  AP portable chest, 0847 hours. Comparison: 11/12/2015. Findings: The cardiomediastinal silhouette is within normal limits. There is   mild pulmonary vascular congestion. Mild bibasilar airspace disease is noted. There is no pleural effusion or pneumothorax. There are senescent changes within   the spine. Results from [place] encounter on 11/22/20   XR CHEST PORT    Narrative 1 view comparison 25th    Unchanged mild atelectasis in the lower lungs with continued hypoinflation. No  effusion or pneumothorax. Normal heart and mediastinum   XR CHEST PORT    Narrative Chest single view. Comparison single view chest November 22, 2020. Hazy reticular markings throughout the lungs; these are most dense left  mid/lower lung. Perhaps slight improvement in aeration compared to prior  imaging. Cardiac and mediastinal structures unchanged. No pneumothorax or  pleural effusion. XR CHEST SNGL V    Narrative AP portable chest, 0847 hours. Comparison: 11/12/2015. Findings: The cardiomediastinal silhouette is within normal limits. There is  mild pulmonary vascular congestion. Mild bibasilar airspace disease is noted. There is no pleural effusion or pneumothorax. There are senescent changes within  the spine. Impression Impression:   1. Mild pulmonary vascular congestion.   2. Mild bibasilar airspace disease, atelectasis versus developing pneumonia. · Discussion COVID-19 test + 5 to 7 days ago  now with  cough and shortness of breath. Chest x-ray has vague changes of bilateral patchy infiltrate. CT shows extensive groundglass infiltrates throughout the lungs.   Continue Remdesivir Decadron will add Actemra and fully anticoagulate with Eliquedgar Ortiz MD

## 2020-11-30 LAB
BACTERIA SPEC CULT: NORMAL
SPECIAL REQUESTS,SREQ: NORMAL

## 2020-12-04 VITALS
HEART RATE: 102 BPM | DIASTOLIC BLOOD PRESSURE: 84 MMHG | WEIGHT: 195 LBS | HEIGHT: 66 IN | TEMPERATURE: 98 F | BODY MASS INDEX: 31.34 KG/M2 | SYSTOLIC BLOOD PRESSURE: 127 MMHG

## 2020-12-04 DIAGNOSIS — K46.9 RECURRENT HERNIA: ICD-10-CM

## 2020-12-04 RX ORDER — PANTOPRAZOLE SODIUM 40 MG/1
40 TABLET, DELAYED RELEASE ORAL DAILY
COMMUNITY

## 2020-12-04 RX ORDER — ROSUVASTATIN CALCIUM 10 MG/1
10 TABLET, COATED ORAL
COMMUNITY

## 2020-12-04 RX ORDER — METFORMIN HYDROCHLORIDE 500 MG/1
TABLET ORAL 2 TIMES DAILY WITH MEALS
COMMUNITY

## 2020-12-04 RX ORDER — PRAVASTATIN SODIUM 40 MG/1
40 TABLET ORAL
COMMUNITY

## 2020-12-04 RX ORDER — AMLODIPINE BESYLATE 5 MG/1
5 TABLET ORAL DAILY
COMMUNITY

## 2020-12-04 RX ORDER — DOCUSATE SODIUM 100 MG/1
100 CAPSULE, LIQUID FILLED ORAL 2 TIMES DAILY
COMMUNITY

## 2020-12-04 RX ORDER — SITAGLIPTIN AND METFORMIN HYDROCHLORIDE 100; 1000 MG/1; MG/1
TABLET, FILM COATED, EXTENDED RELEASE ORAL
COMMUNITY

## 2020-12-04 RX ORDER — FUROSEMIDE 20 MG/1
TABLET ORAL DAILY
COMMUNITY

## 2020-12-04 RX ORDER — GABAPENTIN 300 MG/1
300 CAPSULE ORAL 3 TIMES DAILY
COMMUNITY

## 2020-12-04 RX ORDER — CYCLOBENZAPRINE HCL 5 MG
5 TABLET ORAL
COMMUNITY

## 2020-12-04 RX ORDER — LOSARTAN POTASSIUM 100 MG/1
100 TABLET ORAL DAILY
COMMUNITY

## 2020-12-04 RX ORDER — TRAMADOL HYDROCHLORIDE 50 MG/1
50 TABLET ORAL
COMMUNITY

## 2020-12-04 RX ORDER — OXYCODONE AND ACETAMINOPHEN 5; 325 MG/1; MG/1
TABLET ORAL
COMMUNITY

## 2020-12-09 ENCOUNTER — OFFICE VISIT (OUTPATIENT)
Dept: OBGYN CLINIC | Age: 60
End: 2020-12-09
Payer: COMMERCIAL

## 2020-12-09 DIAGNOSIS — Z12.31 VISIT FOR SCREENING MAMMOGRAM: Primary | ICD-10-CM

## 2020-12-09 PROCEDURE — 77063 BREAST TOMOSYNTHESIS BI: CPT | Performed by: OBSTETRICS & GYNECOLOGY

## 2020-12-09 PROCEDURE — 77067 SCR MAMMO BI INCL CAD: CPT | Performed by: OBSTETRICS & GYNECOLOGY

## 2020-12-15 LAB
BACTERIA SPEC CULT: NORMAL
SPECIAL REQUESTS,SREQ: NORMAL

## 2021-06-11 ENCOUNTER — HOSPITAL ENCOUNTER (OUTPATIENT)
Dept: LAB | Age: 61
Discharge: HOME | End: 2021-06-11

## 2021-06-11 ENCOUNTER — TRANSCRIBE ORDER (OUTPATIENT)
Dept: REGISTRATION | Age: 61
End: 2021-06-11

## 2021-06-11 DIAGNOSIS — E11.9 DIABETES MELLITUS (HCC): Primary | ICD-10-CM

## 2021-06-11 DIAGNOSIS — E11.9 DIABETES MELLITUS (HCC): ICD-10-CM

## 2022-03-18 PROBLEM — K46.9 RECURRENT HERNIA: Status: ACTIVE | Noted: 2020-12-04

## 2022-03-18 PROBLEM — U07.1 COVID-19: Status: ACTIVE | Noted: 2020-11-22

## 2023-05-18 RX ORDER — OXYCODONE HYDROCHLORIDE AND ACETAMINOPHEN 5; 325 MG/1; MG/1
TABLET ORAL EVERY 4 HOURS PRN
COMMUNITY

## 2023-05-18 RX ORDER — AMLODIPINE BESYLATE 5 MG/1
5 TABLET ORAL DAILY
COMMUNITY

## 2023-05-18 RX ORDER — GABAPENTIN 300 MG/1
300 CAPSULE ORAL 3 TIMES DAILY
COMMUNITY

## 2023-05-18 RX ORDER — ROSUVASTATIN CALCIUM 10 MG/1
10 TABLET, COATED ORAL NIGHTLY
COMMUNITY

## 2023-05-18 RX ORDER — PRAVASTATIN SODIUM 40 MG
40 TABLET ORAL NIGHTLY
COMMUNITY

## 2023-05-18 RX ORDER — PSEUDOEPHEDRINE HCL 30 MG
100 TABLET ORAL 2 TIMES DAILY
COMMUNITY

## 2023-05-18 RX ORDER — CYCLOBENZAPRINE HCL 5 MG
5 TABLET ORAL
COMMUNITY

## 2023-05-18 RX ORDER — SITAGLIPTIN AND METFORMIN HYDROCHLORIDE 1000; 100 MG/1; MG/1
TABLET, FILM COATED, EXTENDED RELEASE ORAL
COMMUNITY

## 2023-05-18 RX ORDER — FUROSEMIDE 20 MG/1
TABLET ORAL DAILY
COMMUNITY

## 2023-05-18 RX ORDER — TRAMADOL HYDROCHLORIDE 50 MG/1
50 TABLET ORAL EVERY 6 HOURS PRN
COMMUNITY

## 2023-05-18 RX ORDER — LOSARTAN POTASSIUM 100 MG/1
100 TABLET ORAL DAILY
COMMUNITY

## 2023-05-18 RX ORDER — PANTOPRAZOLE SODIUM 40 MG/1
40 TABLET, DELAYED RELEASE ORAL DAILY
COMMUNITY

## 2023-11-23 ENCOUNTER — APPOINTMENT (OUTPATIENT)
Facility: HOSPITAL | Age: 63
End: 2023-11-23
Payer: COMMERCIAL

## 2023-11-23 ENCOUNTER — HOSPITAL ENCOUNTER (EMERGENCY)
Facility: HOSPITAL | Age: 63
Discharge: HOME | End: 2023-11-23
Attending: EMERGENCY MEDICINE
Payer: COMMERCIAL

## 2023-11-23 VITALS
SYSTOLIC BLOOD PRESSURE: 136 MMHG | WEIGHT: 199 LBS | HEART RATE: 86 BPM | HEIGHT: 65 IN | RESPIRATION RATE: 19 BRPM | BODY MASS INDEX: 33.15 KG/M2 | DIASTOLIC BLOOD PRESSURE: 81 MMHG | TEMPERATURE: 98 F | OXYGEN SATURATION: 95 %

## 2023-11-23 DIAGNOSIS — R42 DIZZINESS: Primary | ICD-10-CM

## 2023-11-23 DIAGNOSIS — R51.9 ACUTE NONINTRACTABLE HEADACHE, UNSPECIFIED HEADACHE TYPE: ICD-10-CM

## 2023-11-23 DIAGNOSIS — R42 VERTIGO: ICD-10-CM

## 2023-11-23 LAB
ANION GAP SERPL CALC-SCNC: 6 MMOL/L (ref 5–15)
BASOPHILS # BLD: 0 K/UL (ref 0–0.1)
BASOPHILS NFR BLD: 0 % (ref 0–1)
BUN SERPL-MCNC: 11 MG/DL (ref 6–20)
BUN/CREAT SERPL: 18 (ref 12–20)
CA-I BLD-MCNC: 9 MG/DL (ref 8.5–10.1)
CHLORIDE SERPL-SCNC: 108 MMOL/L (ref 97–108)
CO2 SERPL-SCNC: 27 MMOL/L (ref 21–32)
CREAT SERPL-MCNC: 0.61 MG/DL (ref 0.55–1.02)
DIFFERENTIAL METHOD BLD: NORMAL
EOSINOPHIL # BLD: 0.2 K/UL (ref 0–0.4)
EOSINOPHIL NFR BLD: 3 % (ref 0–7)
ERYTHROCYTE [DISTWIDTH] IN BLOOD BY AUTOMATED COUNT: 14 % (ref 11.5–14.5)
GLUCOSE SERPL-MCNC: 141 MG/DL (ref 65–100)
HCT VFR BLD AUTO: 43.9 % (ref 35–47)
HGB BLD-MCNC: 14.1 G/DL (ref 11.5–16)
IMM GRANULOCYTES # BLD AUTO: 0 K/UL (ref 0–0.04)
IMM GRANULOCYTES NFR BLD AUTO: 0 % (ref 0–0.5)
LYMPHOCYTES # BLD: 1.5 K/UL (ref 0.8–3.5)
LYMPHOCYTES NFR BLD: 29 % (ref 12–49)
MCH RBC QN AUTO: 28.5 PG (ref 26–34)
MCHC RBC AUTO-ENTMCNC: 32.1 G/DL (ref 30–36.5)
MCV RBC AUTO: 88.7 FL (ref 80–99)
MONOCYTES # BLD: 0.5 K/UL (ref 0–1)
MONOCYTES NFR BLD: 9 % (ref 5–13)
NEUTS SEG # BLD: 3.2 K/UL (ref 1.8–8)
NEUTS SEG NFR BLD: 59 % (ref 32–75)
NRBC # BLD: 0 K/UL (ref 0–0.01)
NRBC BLD-RTO: 0 PER 100 WBC
PLATELET # BLD AUTO: 350 K/UL (ref 150–400)
PMV BLD AUTO: 9.3 FL (ref 8.9–12.9)
POTASSIUM SERPL-SCNC: 3.6 MMOL/L (ref 3.5–5.1)
RBC # BLD AUTO: 4.95 M/UL (ref 3.8–5.2)
SODIUM SERPL-SCNC: 141 MMOL/L (ref 136–145)
TROPONIN I SERPL HS-MCNC: 4 NG/L (ref 0–51)
WBC # BLD AUTO: 5.4 K/UL (ref 3.6–11)

## 2023-11-23 PROCEDURE — 99284 EMERGENCY DEPT VISIT MOD MDM: CPT

## 2023-11-23 PROCEDURE — 70450 CT HEAD/BRAIN W/O DYE: CPT

## 2023-11-23 PROCEDURE — 36415 COLL VENOUS BLD VENIPUNCTURE: CPT

## 2023-11-23 PROCEDURE — 80048 BASIC METABOLIC PNL TOTAL CA: CPT

## 2023-11-23 PROCEDURE — 85025 COMPLETE CBC W/AUTO DIFF WBC: CPT

## 2023-11-23 PROCEDURE — 2580000003 HC RX 258: Performed by: EMERGENCY MEDICINE

## 2023-11-23 PROCEDURE — 84484 ASSAY OF TROPONIN QUANT: CPT

## 2023-11-23 PROCEDURE — 6370000000 HC RX 637 (ALT 250 FOR IP): Performed by: EMERGENCY MEDICINE

## 2023-11-23 PROCEDURE — 93005 ELECTROCARDIOGRAM TRACING: CPT | Performed by: EMERGENCY MEDICINE

## 2023-11-23 RX ORDER — ONDANSETRON 4 MG/1
4 TABLET, ORALLY DISINTEGRATING ORAL 3 TIMES DAILY PRN
Qty: 21 TABLET | Refills: 0 | Status: SHIPPED | OUTPATIENT
Start: 2023-11-23

## 2023-11-23 RX ORDER — MECLIZINE HYDROCHLORIDE 25 MG/1
25 TABLET ORAL
Status: COMPLETED | OUTPATIENT
Start: 2023-11-23 | End: 2023-11-23

## 2023-11-23 RX ORDER — MECLIZINE HYDROCHLORIDE 50 MG/1
50 TABLET ORAL 3 TIMES DAILY PRN
Qty: 30 TABLET | Refills: 0 | Status: SHIPPED | OUTPATIENT
Start: 2023-11-23 | End: 2023-12-03

## 2023-11-23 RX ORDER — ONDANSETRON 4 MG/1
4 TABLET, ORALLY DISINTEGRATING ORAL ONCE
Status: COMPLETED | OUTPATIENT
Start: 2023-11-23 | End: 2023-11-23

## 2023-11-23 RX ORDER — BUTALBITAL, ACETAMINOPHEN AND CAFFEINE 50; 325; 40 MG/1; MG/1; MG/1
2 TABLET ORAL
Status: COMPLETED | OUTPATIENT
Start: 2023-11-23 | End: 2023-11-23

## 2023-11-23 RX ORDER — 0.9 % SODIUM CHLORIDE 0.9 %
1000 INTRAVENOUS SOLUTION INTRAVENOUS ONCE
Status: COMPLETED | OUTPATIENT
Start: 2023-11-23 | End: 2023-11-23

## 2023-11-23 RX ADMIN — MECLIZINE HYDROCHLORIDE 25 MG: 25 TABLET ORAL at 07:41

## 2023-11-23 RX ADMIN — ONDANSETRON 4 MG: 4 TABLET, ORALLY DISINTEGRATING ORAL at 07:40

## 2023-11-23 RX ADMIN — SODIUM CHLORIDE 1000 ML: 9 INJECTION, SOLUTION INTRAVENOUS at 07:39

## 2023-11-23 RX ADMIN — BUTALBITAL, ACETAMINOPHEN, AND CAFFEINE 2 TABLET: 50; 325; 40 TABLET ORAL at 09:14

## 2023-11-23 ASSESSMENT — PAIN SCALES - GENERAL: PAINLEVEL_OUTOF10: 2

## 2023-11-23 ASSESSMENT — LIFESTYLE VARIABLES
HOW OFTEN DO YOU HAVE A DRINK CONTAINING ALCOHOL: NEVER
HOW MANY STANDARD DRINKS CONTAINING ALCOHOL DO YOU HAVE ON A TYPICAL DAY: PATIENT DOES NOT DRINK

## 2023-11-23 ASSESSMENT — PAIN DESCRIPTION - LOCATION: LOCATION: HEAD

## 2023-11-23 ASSESSMENT — PAIN - FUNCTIONAL ASSESSMENT: PAIN_FUNCTIONAL_ASSESSMENT: 0-10

## 2023-11-23 NOTE — ED PROVIDER NOTES
[facility] EMERGENCY DEPT  EMERGENCY DEPARTMENT HISTORY AND PHYSICAL EXAM      Date: 11/23/2023  Patient Name: Landen Arora  MRN: [id number]  [facility] 1960  Date of evaluation: 11/23/2023  Provider: Chacho Santos MD   Note Started: 11:41 AM EST 11/23/23    HISTORY OF PRESENT ILLNESS     Chief Complaint   Patient presents with    Dizziness    Headache       History Provided By: Patient    HPI: Landen Arora is a 61 y.o. female patient's been having intermittent room spinning dizziness since Tuesday. Presents on Thursday. No extremity numbness or weakness does have a pressure headache. No falls. PAST MEDICAL HISTORY   Past Medical History:  Past Medical History:   Diagnosis Date    Arthritis     Diabetes ([address])     GERD (gastroesophageal reflux disease)     Hypertension     Recurrent hernia 12/4/2020    Recurrent hernia of anterior abdominal wall. Past Surgical History:  Past Surgical History:   Procedure Laterality Date    HERNIA REPAIR      OTHER SURGICAL HISTORY      hernia repair       Family History:  Family History   Problem Relation Age of Onset    Diabetes Mother        Social History:  Social History     Tobacco Use    Smoking status: Never    Smokeless tobacco: Never   Vaping Use    Vaping Use: Never used   Substance Use Topics    Alcohol use: Never    Drug use: Never       Allergies: Allergies   Allergen Reactions    Ampicillin Itching    Oxycodone-Acetaminophen Itching       PCP: BUDDY Koenig NP    Current Meds:   No current facility-administered medications for this encounter.      Current Outpatient Medications   Medication Sig Dispense Refill    meclizine (ANTIVERT) 50 MG tablet Take 1 tablet by mouth 3 times daily as needed for Dizziness 30 tablet 0    ondansetron (ZOFRAN-ODT) 4 MG disintegrating tablet Take 1 tablet by mouth 3 times daily as needed for Nausea or Vomiting 21 tablet 0    DICLOFENAC SODIUM PO Take by mouth      amLODIPine (NORVASC) 5 MG tablet Take 1 tablet by

## 2023-11-23 NOTE — DISCHARGE INSTRUCTIONS
Thank you! Thank you for allowing me to care for you in the emergency department. It is my goal to provide you with excellent care. If you have not received excellent quality care, please ask to speak to the nurse manager. Please fill out the survey that will come to you by mail or email since we listen to your feedback! Below you will find a list of your tests from today's visit. Should you have any questions, please do not hesitate to call the emergency department.     Labs  Recent Results (from the past 12 hour(s))   CBC with Auto Differential    Collection Time: 11/23/23  7:36 AM   Result Value Ref Range    WBC 5.4 3.6 - 11.0 K/uL    RBC 4.95 3.80 - 5.20 M/uL    Hemoglobin 14.1 11.5 - 16.0 g/dL    Hematocrit 43.9 35.0 - 47.0 %    MCV 88.7 80.0 - 99.0 FL    MCH 28.5 26.0 - 34.0 PG    MCHC 32.1 30.0 - 36.5 g/dL    RDW 14.0 11.5 - 14.5 %    Platelets 295 204 - 122 K/uL    MPV 9.3 8.9 - 12.9 FL    Nucleated RBCs 0.0 0.0  WBC    nRBC 0.00 0.00 - 0.01 K/uL    Neutrophils % 59 32 - 75 %    Lymphocytes % 29 12 - 49 %    Monocytes % 9 5 - 13 %    Eosinophils % 3 0 - 7 %    Basophils % 0 0 - 1 %    Immature Granulocytes 0 0 - 0.5 %    Neutrophils Absolute 3.2 1.8 - 8.0 K/UL    Lymphocytes Absolute 1.5 0.8 - 3.5 K/UL    Monocytes Absolute 0.5 0.0 - 1.0 K/UL    Eosinophils Absolute 0.2 0.0 - 0.4 K/UL    Basophils Absolute 0.0 0.0 - 0.1 K/UL    Absolute Immature Granulocyte 0.0 0.00 - 0.04 K/UL    Differential Type AUTOMATED     BMP    Collection Time: 11/23/23  7:36 AM   Result Value Ref Range    Sodium 141 136 - 145 mmol/L    Potassium 3.6 3.5 - 5.1 mmol/L    Chloride 108 97 - 108 mmol/L    CO2 27 21 - 32 mmol/L    Anion Gap 6 5 - 15 mmol/L    Glucose 141 (H) 65 - 100 mg/dL    BUN 11 6 - 20 mg/dL    Creatinine 0.61 0.55 - 1.02 mg/dL    Bun/Cre Ratio 18 12 - 20      Est, Glom Filt Rate >60 >60 ml/min/1.73m2    Calcium 9.0 8.5 - 10.1 mg/dL   Troponin    Collection Time: 11/23/23  7:36 AM   Result Value Ref

## 2023-11-25 LAB
EKG ATRIAL RATE: 94 BPM
EKG DIAGNOSIS: NORMAL
EKG P AXIS: 40 DEGREES
EKG P-R INTERVAL: 146 MS
EKG Q-T INTERVAL: 366 MS
EKG QRS DURATION: 96 MS
EKG QTC CALCULATION (BAZETT): 457 MS
EKG R AXIS: -31 DEGREES
EKG T AXIS: 28 DEGREES
EKG VENTRICULAR RATE: 94 BPM